# Patient Record
Sex: MALE | Race: BLACK OR AFRICAN AMERICAN | Employment: OTHER | URBAN - METROPOLITAN AREA
[De-identification: names, ages, dates, MRNs, and addresses within clinical notes are randomized per-mention and may not be internally consistent; named-entity substitution may affect disease eponyms.]

---

## 2018-03-13 ENCOUNTER — OFFICE VISIT (OUTPATIENT)
Dept: ORTHOPEDIC SURGERY | Facility: CLINIC | Age: 58
End: 2018-03-13

## 2018-03-13 VITALS
HEART RATE: 87 BPM | BODY MASS INDEX: 24.92 KG/M2 | WEIGHT: 184 LBS | OXYGEN SATURATION: 99 % | SYSTOLIC BLOOD PRESSURE: 133 MMHG | DIASTOLIC BLOOD PRESSURE: 86 MMHG | TEMPERATURE: 98.4 F | RESPIRATION RATE: 16 BRPM | HEIGHT: 72 IN

## 2018-03-13 DIAGNOSIS — G89.29 CHRONIC PAIN OF LEFT KNEE: ICD-10-CM

## 2018-03-13 DIAGNOSIS — M25.562 CHRONIC PAIN OF LEFT KNEE: ICD-10-CM

## 2018-03-13 DIAGNOSIS — M17.12 PRIMARY OSTEOARTHRITIS OF LEFT KNEE: Primary | ICD-10-CM

## 2018-03-13 RX ORDER — BETAMETHASONE SODIUM PHOSPHATE AND BETAMETHASONE ACETATE 3; 3 MG/ML; MG/ML
6 INJECTION, SUSPENSION INTRA-ARTICULAR; INTRALESIONAL; INTRAMUSCULAR; SOFT TISSUE ONCE
Qty: 0.5 ML | Refills: 0
Start: 2018-03-13 | End: 2018-03-13

## 2018-03-13 RX ORDER — BUPIVACAINE HYDROCHLORIDE 2.5 MG/ML
4 INJECTION, SOLUTION EPIDURAL; INFILTRATION; INTRACAUDAL ONCE
Qty: 4 ML | Refills: 0
Start: 2018-03-13 | End: 2018-03-13

## 2018-03-13 RX ORDER — SIMVASTATIN 20 MG/1
TABLET, FILM COATED ORAL
COMMUNITY

## 2018-03-13 NOTE — PROGRESS NOTES
Patient: Jade Lizarraga                MRN: [id number]       SSN: [id number]  YOB: 1960        AGE: 62 y.o. SEX: male    PCP: No primary care provider on file.  03/13/18    Chief Complaint   Patient presents with    Knee Pain     L KNEE PAIN 2/10     HISTORY:  Jade Lizarraga is a 62 y.o. male who is seen for left knee pain. He reports pain for the past 2 months with no history of injury. He notes relief with OTC NSAIDs and pain relievers. He notes pain with standing and walking. He notes increased pain especially when kickboxing- especially when twisting on his left leg during support stance. He states the majority of the kicks involved planting his left leg and kicking with his right. Pain Assessment  3/13/2018   Location of Pain Knee   Location Modifiers Left   Severity of Pain 2   Quality of Pain Aching   Duration of Pain A few minutes   Aggravating Factors Bending;Stretching   Limiting Behavior No   Relieving Factors Ice;Heat;Elevation;NSAID   Relieving Factors Comment compression   Result of Injury No     Occupation, etc:  Mr. Mikhail Vinson retired as an  at the [employer]. He retired about 1 year ago. He is originally from [place] but moved up here after graduating from college 35 years ago. He lives in [place] with his wife. He has two adult sons and 7 grandchildren. He has one son in the [employer] and one in school at [employer]. He enjoys kickboxing for exercise and fitness. He states he has been participating in kickboxing for the past 7 years. Current weight is 184 pounds. He is 6' tall. He is not diabetic or hypertensive. No results found for: HBA1C, HGBE8, CLS8XFBB, WGQ6RTKK, MAN3ZKWP  Weight Metrics 3/13/2018   Weight 184 lb   BMI 24.95 kg/m2       There is no problem list on file for this patient. REVIEW OF SYSTEMS: All Below are Negative except: See HPI   Constitutional: negative for fever, chills, and weight loss.    Cardiovascular: negative for chest pain, claudication, leg swelling, SOB, ROLDAN   Gastrointestinal: Negative for pain, N/V/C/D, Blood in stool or urine, dysuria,  hematuria, incontinence, pelvic pain. Musculoskeletal: See HPI   Neurological: Negative for dizziness and weakness. Negative for headaches, Visual changes, confusion, seizures   Phychiatric/Behavioral: Negative for depression, memory loss, substance  abuse. Extremities: Negative for hair changes, rash, or skin lesion changes. Hematologic: Negative for bleeding problems, bruising, pallor or swollen lymph  nodes   Peripheral Vascular: No calf pain, no circulation deficits. Social History     Social History    Marital status:      Spouse name: N/A    Number of children: N/A    Years of education: N/A     Occupational History    Not on file. Social History Main Topics    Smoking status: Never Smoker    Smokeless tobacco: Never Used    Alcohol use No    Drug use: No    Sexual activity: Not on file     Other Topics Concern    Not on file     Social History Narrative    No narrative on file      Not on File   Current Outpatient Prescriptions   Medication Sig    simvastatin (ZOCOR) 20 mg tablet Take  by mouth nightly. No current facility-administered medications for this visit.        PHYSICAL EXAMINATION:  Visit Vitals    /86    Pulse 87    Temp 98.4 °F (36.9 °C) (Oral)    Resp 16    Ht 6' (1.829 m)    Wt 184 lb (83.5 kg)    SpO2 99%    BMI 24.95 kg/m2      ORTHO EXAMINATION:  Examination Right knee Left knee   Skin Intact Intact   Range of motion 120-0 115-0   Effusion - -   Medial joint line tenderness + +   Lateral joint line tenderness - -   Popliteal tenderness - -   Osteophytes palpable - +, medial   Dannys - -   Patella crepitus - +   Anterior drawer - -   Lateral laxity - -   Medial laxity - -   Varus deformity - -   Valgus deformity - -   Pretibial edema - -   Calf tenderness - -     PROCEDURE:  After discussing treatment options, patient's left knee was injected with 4 cc Marcaine and 1/2 cc Celestone. Chart reviewed for the following:   Lyly Doyle MD, have reviewed the History, Physical and updated the Allergic reactions for [address] performed immediately prior to start of procedure:  Lyly Doyle MD, have performed the following reviews on Laura Morel prior to the start of the procedure:            * Patient was identified by name and date of birth   * Agreement on procedure being performed was verified  * Risks and Benefits explained to the patient  * Procedure site verified and marked as necessary  * Patient was positioned for comfort  * Consent was obtained     Time: 9:31 AM     Date of procedure: 3/13/2018    Procedure performed by:  Amada Dawson MD    Mr. Melissa Stark tolerated the procedure well with no complications. RADIOGRAPHS:  XR LEFT KNEE 3/13/18  IMPRESSION:  Three views - No fractures, no effusion, moderate medial joint space narrowing, + osteophytes present. Sclerosis and small defect of the medial femoral condyles. IMPRESSION:      ICD-10-CM ICD-9-CM    1. Primary osteoarthritis of left knee M17.12 715.16 betamethasone (CELESTONE SOLUSPAN) 6 mg/mL injection      BETAMETHASONE ACETATE & SODIUM PHOSPHATE INJECTION 3 MG EA.      DRAIN/INJECT LARGE JOINT/BURSA      bupivacaine, PF, (MARCAINE, PF,) 0.25 % (2.5 mg/mL) injection      PROCEDURE AUTHORIZATION TO    2. Chronic pain of left knee M25.562 719.46 AMB POC X-RAY KNEE 3 VIEW    G89.29 338.29 betamethasone (CELESTONE SOLUSPAN) 6 mg/mL injection      BETAMETHASONE ACETATE & SODIUM PHOSPHATE INJECTION 3 MG EA.      DRAIN/INJECT LARGE JOINT/BURSA      bupivacaine, PF, (MARCAINE, PF,) 0.25 % (2.5 mg/mL) injection      PROCEDURE AUTHORIZATION TO      PLAN:  After discussing treatment options, patient's left knee was injected with 4 cc Marcaine and 1/2 cc Celestone. He will follow up as needed.  Consider visco supplementation if pain continues. We discussed a possible left knee MRI in the future if pain continues.       Scribed by Kashif Christensen ([address]) as dictated by Parul Serrano MD

## 2018-03-13 NOTE — MR AVS SNAPSHOT
Juan Courtney[facility] 
 
 
 [address], [address] [place] [address] 
[phone] Patient: Laura Morel MRN: [id number] TJL:[birth year] Visit Information Date & Time Provider Department Dept. Phone Encounter #  
 3/13/2018  8:30 AM Amada Dawson, [facility]  Follow-up Instructions Return if symptoms worsen or fail to improve. Upcoming Health Maintenance Date Due Hepatitis C Screening 1960 DTaP/Tdap/Td series (1 - Tdap) 3/13/1981 FOBT Q 1 YEAR AGE 50-75 3/13/2010 Influenza Age 5 to Adult 8/1/2017 Allergies as of 3/13/2018  Review Complete On: 3/13/2018 By: Nathan Molina Not on File Current Immunizations  Never Reviewed No immunizations on file. Not reviewed this visit You Were Diagnosed With   
  
 Codes Comments Primary osteoarthritis of left knee    -  Primary ICD-10-CM: M17.12 
ICD-9-CM: 715.16 Chronic pain of left knee     ICD-10-CM: M25.562, G89.29 ICD-9-CM: 719.46, 338.29 Vitals BP Pulse Temp Resp Height(growth percentile) Weight(growth percentile) 133/86 87 98.4 °F (36.9 °C) (Oral) 16 6' (1.829 m) 184 lb (83.5 kg) SpO2 BMI Smoking Status 99% 24.95 kg/m2 Never Smoker BMI and BSA Data Body Mass Index Body Surface Area 24.95 kg/m 2 2.06 m 2 Your Updated Medication List  
  
   
This list is accurate as of 3/13/18  9:36 AM.  Always use your most recent med list.  
  
  
  
  
 simvastatin 20 mg tablet Commonly known as:  ZOCOR Take  by mouth nightly. We Performed the Following AMB POC X-RAY KNEE 3 VIEW [70614 CPT(R)] Follow-up Instructions Return if symptoms worsen or fail to improve. Patient Instructions Knee Arthritis: Exercises Your Care Instructions Here are some examples of exercises for knee arthritis.  Start each exercise slowly. Ease off the exercise if you start to have pain. Your doctor or physical therapist will tell you when you can start these exercises and which ones will work best for you. How to do the exercises Knee flexion with heel slide 1. Lie on your back with your knees bent. 2. Slide your heel back by bending your affected knee as far as you can. Then hook your other foot around your ankle to help pull your heel even farther back. 3. Hold for about 6 seconds, then rest for up to 10 seconds. 4. Repeat 8 to 12 times. 5. Switch legs and repeat steps 1 through 4, even if only one knee is sore. [facility] 1. Sit with your affected leg straight and supported on the floor or a firm bed. Place a small, rolled-up towel under your knee. Your other leg should be bent, with that foot flat on the floor. 2. Tighten the thigh muscles of your affected leg by pressing the back of your knee down into the towel. 3. Hold for about 6 seconds, then rest for up to 10 seconds. 4. Repeat 8 to 12 times. 5. Switch legs and repeat steps 1 through 4, even if only one knee is sore. Straight-leg raises to the front 1. Lie on your back with your good knee bent so that your foot rests flat on the floor. Your affected leg should be straight. Make sure that your low back has a normal curve. You should be able to slip your hand in between the floor and the small of your back, with your palm touching the floor and your back touching the back of your hand. 2. Tighten the thigh muscles in your affected leg by pressing the back of your knee flat down to the floor. Hold your knee straight. 3. Keeping the thigh muscles tight and your leg straight, lift your affected leg up so that your heel is about 12 inches off the floor. Hold for about 6 seconds, then lower slowly. 4. Relax for up to 10 seconds between repetitions. 5. Repeat 8 to 12 times. 6. Switch legs and repeat steps 1 through 5, even if only one knee is sore. Active knee flexion 1. Lie on your stomach with your knees straight. If your kneecap is uncomfortable, roll up a washcloth and put it under your leg just above your kneecap. 2. Lift the foot of your affected leg by bending the knee so that you bring the foot up toward your buttock. If this motion hurts, try it without bending your knee quite as far. This may help you avoid any painful motion. 3. Slowly move your leg up and down. 4. Repeat 8 to 12 times. 5. Switch legs and repeat steps 1 through 4, even if only one knee is sore. Quadriceps stretch (facedown) 1. Lie flat on your stomach, and rest your face on the floor. 2. Wrap a towel or belt strap around the lower part of your affected leg. Then use the towel or belt strap to slowly pull your heel toward your buttock until you feel a stretch. 3. Hold for about 15 to 30 seconds, then relax your leg against the towel or belt strap. 4. Repeat 2 to 4 times. 5. Switch legs and repeat steps 1 through 4, even if only one knee is sore. Stationary exercise bike 1. If you do not have a stationary exercise bike at home, you can find one to ride at your local health club or community center. 2. Adjust the height of the bike seat so that your knee is slightly bent when your leg is extended downward. If your knee hurts when the pedal reaches the top, you can raise the seat so that your knee does not bend as much. 3. Start slowly. At first, try to do 5 to 10 minutes of cycling with little to no resistance. Then increase your time and the resistance bit by bit until you can do 20 to 30 minutes without pain. 4. If you start to have pain, rest your knee until your pain gets back to the level that is normal for you. Or cycle for less time or with less effort. Follow-up care is a key part of your treatment and safety.  Be sure to make and go to all appointments, and call your doctor if you are having problems. It's also a good idea to know your test results and keep a list of the medicines you take. Where can you learn more? Go to http://cash-chad.info/. Enter C159 in the search box to learn more about \"Knee Arthritis: Exercises. \" Current as of: March 21, 2017 Content Version: 11.4 © 4588-0788 [employer]. Care instructions adapted under license by [employer] (which disclaims liability or warranty for this information). If you have questions about a medical condition or this instruction, always ask your healthcare professional. [place] [address] any warranty or liability for your use of this information. Joint Injections: Care Instructions Your Care Instructions Joint injections are shots into a joint, such as the knee. They may be used to put in medicines, such as pain relievers. Or they can be used to take out fluid. Sometimes the fluid is tested in a lab. This can help find the cause of a joint problem. A corticosteroid, or steroid, shot is used to reduce inflammation in tendons or joints. It is often used to treat problems such as arthritis, tendinitis, and bursitis. Steroids can be injected directly into a painful, inflamed joint. They can also help reduce inflammation of a bursa. A bursa is a sac of fluid. It cushions and lubricates areas where tendons, ligaments, skin, muscles, or bones rub against each other. A steroid shot can sometimes help with short-term pain relief when other treatments haven't worked. If steroid shots help, pain may improve for weeks or months. Follow-up care is a key part of your treatment and safety. Be sure to make and go to all appointments, and call your doctor if you are having problems. It's also a good idea to know your test results and keep a list of the medicines you take. How can you care for yourself at home? · Put ice or a cold pack on the area for 10 to 20 minutes at a time.  Put a thin cloth between the ice and your skin. · Take anti-inflammatory medicines to reduce pain, swelling, or inflammation. These include ibuprofen (Advil, Motrin) and naproxen (Aleve). Read and follow all instructions on the label. · Avoid strenuous activities for several days, especially those that put stress on the area where you got the shot. · If you have dressings over the area, keep them clean and dry. You may remove them when your doctor tells you to. When should you call for help? Call your doctor now or seek immediate medical care if: 
? · You have signs of infection, such as: 
¨ Increased pain, swelling, warmth, or redness. ¨ Red streaks leading from the site. ¨ Pus draining from the site. ¨ A fever. ? Watch closely for changes in your health, and be sure to contact your doctor if you have any problems. Where can you learn more? Go to http://cash-chad.info/. Enter N616 in the search box to learn more about \"Joint Injections: Care Instructions. \" Current as of: March 21, 2017 Content Version: 11.4 © 5970-0773 Xenith. Care instructions adapted under license by [employer] Searcher (which disclaims liability or warranty for this information). If you have questions about a medical condition or this instruction, always ask your healthcare professional. [address] any warranty or liability for your use of this information. Introducing [facility]! Toshia Hawley introduces [address] patient portal. Now you can access parts of your medical record, email your doctor's office, and request medication refills online. 1. In your internet browser, go to [email or web address] 2. Click on the First Time User? Click Here link in the Sign In box. You will see the New Member Sign Up page. 3. Enter your [address] Access Code exactly as it appears below.  You will not need to use this code after youve completed the sign-up process. If you do not sign up before the expiration date, you must request a new code. · Pocket Video Access Code: [email or web address] Expires: 6/11/2018  8:37 AM 
 
4. Enter the last four digits of your Social Security Number (xxxx) and Date of Birth (mm/dd/yyyy) as indicated and click Submit. You will be taken to the next sign-up page. 5. Create a Pocket Video ID. This will be your Pocket Video login ID and cannot be changed, so think of one that is secure and easy to remember. 6. Create a Pocket Video password. You can change your password at any time. 7. Enter your Password Reset Question and Answer. This can be used at a later time if you forget your password. 8. Enter your e-mail address. You will receive e-mail notification when new information is available in [address]. 9. Click Sign Up. You can now view and download portions of your medical record. 10. Click the Download Summary menu link to download a portable copy of your medical information. If you have questions, please visit the Frequently Asked Questions section of the Pocket Video website. Remember, Pocket Video is NOT to be used for urgent needs. For medical emergencies, dial 911. Now available from your iPhone and Android! Please provide this summary of care documentation to your next provider. If you have any questions after today's visit, please call [phone].

## 2018-03-13 NOTE — PATIENT INSTRUCTIONS
Knee Arthritis: Exercises  Your Care Instructions  Here are some examples of exercises for knee arthritis. Start each exercise slowly. Ease off the exercise if you start to have pain. Your doctor or physical therapist will tell you when you can start these exercises and which ones will work best for you. How to do the exercises  Knee flexion with heel slide    1. Lie on your back with your knees bent. 2. Slide your heel back by bending your affected knee as far as you can. Then hook your other foot around your ankle to help pull your heel even farther back. 3. Hold for about 6 seconds, then rest for up to 10 seconds. 4. Repeat 8 to 12 times. 5. Switch legs and repeat steps 1 through 4, even if only one knee is sore. Quad sets    1. Sit with your affected leg straight and supported on the floor or a firm bed. Place a small, rolled-up towel under your knee. Your other leg should be bent, with that foot flat on the floor. 2. Tighten the thigh muscles of your affected leg by pressing the back of your knee down into the towel. 3. Hold for about 6 seconds, then rest for up to 10 seconds. 4. Repeat 8 to 12 times. 5. Switch legs and repeat steps 1 through 4, even if only one knee is sore. Straight-leg raises to the front    1. Lie on your back with your good knee bent so that your foot rests flat on the floor. Your affected leg should be straight. Make sure that your low back has a normal curve. You should be able to slip your hand in between the floor and the small of your back, with your palm touching the floor and your back touching the back of your hand. 2. Tighten the thigh muscles in your affected leg by pressing the back of your knee flat down to the floor. Hold your knee straight. 3. Keeping the thigh muscles tight and your leg straight, lift your affected leg up so that your heel is about 12 inches off the floor. Hold for about 6 seconds, then lower slowly.   4. Relax for up to 10 seconds between repetitions. 5. Repeat 8 to 12 times. 6. Switch legs and repeat steps 1 through 5, even if only one knee is sore. Active knee flexion    1. Lie on your stomach with your knees straight. If your kneecap is uncomfortable, roll up a washcloth and put it under your leg just above your kneecap. 2. Lift the foot of your affected leg by bending the knee so that you bring the foot up toward your buttock. If this motion hurts, try it without bending your knee quite as far. This may help you avoid any painful motion. 3. Slowly move your leg up and down. 4. Repeat 8 to 12 times. 5. Switch legs and repeat steps 1 through 4, even if only one knee is sore. Quadriceps stretch (facedown)    1. Lie flat on your stomach, and rest your face on the floor. 2. Wrap a towel or belt strap around the lower part of your affected leg. Then use the towel or belt strap to slowly pull your heel toward your buttock until you feel a stretch. 3. Hold for about 15 to 30 seconds, then relax your leg against the towel or belt strap. 4. Repeat 2 to 4 times. 5. Switch legs and repeat steps 1 through 4, even if only one knee is sore. Stationary exercise bike    1. If you do not have a stationary exercise bike at home, you can find one to ride at your local health club or community center. 2. Adjust the height of the bike seat so that your knee is slightly bent when your leg is extended downward. If your knee hurts when the pedal reaches the top, you can raise the seat so that your knee does not bend as much. 3. Start slowly. At first, try to do 5 to 10 minutes of cycling with little to no resistance. Then increase your time and the resistance bit by bit until you can do 20 to 30 minutes without pain. 4. If you start to have pain, rest your knee until your pain gets back to the level that is normal for you. Or cycle for less time or with less effort. Follow-up care is a key part of your treatment and safety.  Be sure to make and go to all appointments, and call your doctor if you are having problems. It's also a good idea to know your test results and keep a list of the medicines you take. Where can you learn more? Go to http://cash-chad.info/. Enter C159 in the search box to learn more about \"Knee Arthritis: Exercises. \"  Current as of: March 21, 2017  Content Version: 11.4  © 2006-2017 [employer]. Care instructions adapted under license by [employer] My Game (which disclaims liability or warranty for this information). If you have questions about a medical condition or this instruction, always ask your healthcare professional. [place] [address] any warranty or liability for your use of this information. Joint Injections: Care Instructions  Your Care Instructions  Joint injections are shots into a joint, such as the knee. They may be used to put in medicines, such as pain relievers. Or they can be used to take out fluid. Sometimes the fluid is tested in a lab. This can help find the cause of a joint problem. A corticosteroid, or steroid, shot is used to reduce inflammation in tendons or joints. It is often used to treat problems such as arthritis, tendinitis, and bursitis. Steroids can be injected directly into a painful, inflamed joint. They can also help reduce inflammation of a bursa. A bursa is a sac of fluid. It cushions and lubricates areas where tendons, ligaments, skin, muscles, or bones rub against each other. A steroid shot can sometimes help with short-term pain relief when other treatments haven't worked. If steroid shots help, pain may improve for weeks or months. Follow-up care is a key part of your treatment and safety. Be sure to make and go to all appointments, and call your doctor if you are having problems. It's also a good idea to know your test results and keep a list of the medicines you take. How can you care for yourself at home?   · Put ice or a cold pack on the area for 10 to 20 minutes at a time. Put a thin cloth between the ice and your skin. · Take anti-inflammatory medicines to reduce pain, swelling, or inflammation. These include ibuprofen (Advil, Motrin) and naproxen (Aleve). Read and follow all instructions on the label. · Avoid strenuous activities for several days, especially those that put stress on the area where you got the shot. · If you have dressings over the area, keep them clean and dry. You may remove them when your doctor tells you to. When should you call for help? Call your doctor now or seek immediate medical care if:  ? · You have signs of infection, such as:  ¨ Increased pain, swelling, warmth, or redness. ¨ Red streaks leading from the site. ¨ Pus draining from the site. ¨ A fever. ? Watch closely for changes in your health, and be sure to contact your doctor if you have any problems. Where can you learn more? Go to http://cash-chad.info/. Enter N616 in the search box to learn more about \"Joint Injections: Care Instructions. \"  Current as of: March 21, 2017  Content Version: 11.4  © 2257-2947 [employer]Buzz. Care instructions adapted under license by [employer] (which disclaims liability or warranty for this information). If you have questions about a medical condition or this instruction, always ask your healthcare professional. [place] [address] any warranty or liability for your use of this information.

## 2018-04-26 ENCOUNTER — OFFICE VISIT (OUTPATIENT)
Dept: ORTHOPEDIC SURGERY | Facility: CLINIC | Age: 58
End: 2018-04-26

## 2018-04-26 VITALS
OXYGEN SATURATION: 98 % | HEIGHT: 72 IN | TEMPERATURE: 98.2 F | RESPIRATION RATE: 18 BRPM | SYSTOLIC BLOOD PRESSURE: 132 MMHG | DIASTOLIC BLOOD PRESSURE: 75 MMHG | WEIGHT: 185.2 LBS | BODY MASS INDEX: 25.09 KG/M2 | HEART RATE: 90 BPM

## 2018-04-26 DIAGNOSIS — G89.29 CHRONIC PAIN OF LEFT KNEE: ICD-10-CM

## 2018-04-26 DIAGNOSIS — M25.562 CHRONIC PAIN OF LEFT KNEE: ICD-10-CM

## 2018-04-26 DIAGNOSIS — M17.12 PRIMARY OSTEOARTHRITIS OF LEFT KNEE: Primary | ICD-10-CM

## 2018-04-26 RX ORDER — BETAMETHASONE SODIUM PHOSPHATE AND BETAMETHASONE ACETATE 3; 3 MG/ML; MG/ML
6 INJECTION, SUSPENSION INTRA-ARTICULAR; INTRALESIONAL; INTRAMUSCULAR; SOFT TISSUE ONCE
Qty: 0.5 ML | Refills: 0
Start: 2018-04-26 | End: 2018-04-26

## 2018-04-26 RX ORDER — BUPIVACAINE HYDROCHLORIDE 2.5 MG/ML
4 INJECTION, SOLUTION EPIDURAL; INFILTRATION; INTRACAUDAL ONCE
Qty: 4 ML | Refills: 0
Start: 2018-04-26 | End: 2018-04-26

## 2018-04-26 NOTE — PROGRESS NOTES
Patient: Kristy Inman                MRN: [id number]       SSN: [id number]  YOB: 1960        AGE: 62 y.o. SEX: male    PCP: No primary care provider on file. 04/26/18    Chief Complaint   Patient presents with    Knee Pain     Left     HISTORY:  Kristy Inman is a 62 y.o. male who is seen for increased left knee pain. His last knee injection helped for about a week with incomplete relief. He reports pain for the past 2 months with no history of injury. He notes relief with OTC NSAIDs and pain relievers. He notes pain with standing and walking. He notes increased pain especially when kickboxing- especially when twisting on his left leg during support stance. He states the majority of the kicks involved planting his left leg and kicking with his right. He has tried a variety of conservative measures including cortisone injections, NSAIDs, and physical therapy. Pain Assessment  3/13/2018   Location of Pain Knee   Location Modifiers Left   Severity of Pain 2   Quality of Pain Aching   Duration of Pain A few minutes   Aggravating Factors Bending;Stretching   Limiting Behavior No   Relieving Factors Ice;Heat;Elevation;NSAID   Relieving Factors Comment compression   Result of Injury No     Occupation, etc:  Mr. Lise Golden retired as an  at the [employer]. He retired about 1 year ago. He is originally from [place] but moved up here after graduating from college 35 years ago. He lives in [place] with his wife. He has two adult sons and 7 grandchildren. He has one son in the [place] and one in school at [employer]. He enjoys kickboxing for exercise and fitness. He states he has been participating in kickboxing for the past 7 years. Current weight is 185 pounds. He is 6' tall. He is not diabetic or hypertensive.      No results found for: HBA1C, HGBE8, LLK2VIUA, WDZ1VHCZ, NAS9CYYE  Weight Metrics 4/26/2018 3/13/2018   Weight 185 lb 3.2 oz 184 lb   BMI 25.12 kg/m2 24.95 kg/m2       There is no problem list on file for this patient. REVIEW OF SYSTEMS: All Below are Negative except: See HPI   Constitutional: negative for fever, chills, and weight loss. Cardiovascular: negative for chest pain, claudication, leg swelling, SOB, ROLDAN   Gastrointestinal: Negative for pain, N/V/C/D, Blood in stool or urine, dysuria,  hematuria, incontinence, pelvic pain. Musculoskeletal: See HPI   Neurological: Negative for dizziness and weakness. Negative for headaches, Visual changes, confusion, seizures   Phychiatric/Behavioral: Negative for depression, memory loss, substance  abuse. Extremities: Negative for hair changes, rash, or skin lesion changes. Hematologic: Negative for bleeding problems, bruising, pallor or swollen lymph  nodes   Peripheral Vascular: No calf pain, no circulation deficits. Social History     Social History    Marital status:      Spouse name: N/A    Number of children: N/A    Years of education: N/A     Occupational History    Not on file. Social History Main Topics    Smoking status: Never Smoker    Smokeless tobacco: Never Used    Alcohol use No    Drug use: No    Sexual activity: Not on file     Other Topics Concern    Not on file     Social History Narrative      No Known Allergies   Current Outpatient Prescriptions   Medication Sig    simvastatin (ZOCOR) 20 mg tablet Take  by mouth nightly. No current facility-administered medications for this visit.        PHYSICAL EXAMINATION:  Visit Vitals    /75    Pulse 90    Temp 98.2 °F (36.8 °C) (Oral)    Resp 18    Ht 6' (1.829 m)    Wt 185 lb 3.2 oz (84 kg)    SpO2 98%    BMI 25.12 kg/m2      ORTHO EXAMINATION:  Examination Right knee Left knee   Skin Intact Intact   Range of motion 120-0 115-0   Effusion - -   Medial joint line tenderness + +   Lateral joint line tenderness - -   Popliteal tenderness - -   Osteophytes palpable - +, medial   Dannys - -   Patella crepitus - +   Anterior drawer - -   Lateral laxity - -   Medial laxity - -   Varus deformity - -   Valgus deformity - -   Pretibial edema - -   Calf tenderness - -   Wearing a soft knee sleeve on his left knee    PROCEDURE:  After discussing treatment options, patient's left knee was injected with 4 cc Marcaine and 1/2 cc Celestone. Chart reviewed for the following:   Imani Sanchez MD, have reviewed the History, Physical and updated the Allergic reactions for [address] performed immediately prior to start of procedure:  Imani Sanchez MD, have performed the following reviews on Lisa Alanan prior to the start of the procedure:            * Patient was identified by name and date of birth   * Agreement on procedure being performed was verified  * Risks and Benefits explained to the patient  * Procedure site verified and marked as necessary  * Patient was positioned for comfort  * Consent was obtained     Time: 2:33 PM     Date of procedure: 4/26/2018    Procedure performed by:  Carolina Salmeron MD    Mr. Umm Mclean tolerated the procedure well with no complications. RADIOGRAPHS:  XR LEFT KNEE 3/13/18  IMPRESSION:  Three views - No fractures, no effusion, moderate medial joint space narrowing, + osteophytes present. Sclerosis and small defect of the medial femoral condyles. IMPRESSION:      ICD-10-CM ICD-9-CM    1. Primary osteoarthritis of left knee M17.12 715.16 PROCEDURE AUTHORIZATION TO       betamethasone (CELESTONE SOLUSPAN) 6 mg/mL injection      BETAMETHASONE ACETATE & SODIUM PHOSPHATE INJECTION 3 MG EA.      DRAIN/INJECT LARGE JOINT/BURSA      bupivacaine, PF, (MARCAINE, PF,) 0.25 % (2.5 mg/mL) injection      REFERRAL TO PHYSICAL THERAPY   2.  Chronic pain of left knee M25.562 719.46 PROCEDURE AUTHORIZATION TO     G89.29 338.29 betamethasone (CELESTONE SOLUSPAN) 6 mg/mL injection      BETAMETHASONE ACETATE & SODIUM PHOSPHATE INJECTION 3 MG EA. DRAIN/INJECT LARGE JOINT/BURSA      bupivacaine, PF, (MARCAINE, PF,) 0.25 % (2.5 mg/mL) injection      REFERRAL TO PHYSICAL THERAPY     PLAN:  After discussing treatment options, patient's left knee was injected with 4 cc Marcaine and 1/2 cc Celestone. He will follow up as needed. Consider visco supplementation if pain continues. He has tried a variety of conservative measures including NSAIDs, injections, and activity restrictions all with incomplete temporary relief. He will start a brief course of outpatient physical therapy to his left knee. We discussed a possible left knee MRI in the future if pain continues.      Scribed by Rory Hewitt ([address] [place] [address] [address]) as dictated by Mesfin Jones MD

## 2018-04-26 NOTE — PATIENT INSTRUCTIONS
Knee Arthritis: Exercises  Your Care Instructions  Here are some examples of exercises for knee arthritis. Start each exercise slowly. Ease off the exercise if you start to have pain. Your doctor or physical therapist will tell you when you can start these exercises and which ones will work best for you. How to do the exercises  Knee flexion with heel slide    1. Lie on your back with your knees bent. 2. Slide your heel back by bending your affected knee as far as you can. Then hook your other foot around your ankle to help pull your heel even farther back. 3. Hold for about 6 seconds, then rest for up to 10 seconds. 4. Repeat 8 to 12 times. 5. Switch legs and repeat steps 1 through 4, even if only one knee is sore. Quad sets    1. Sit with your affected leg straight and supported on the floor or a firm bed. Place a small, rolled-up towel under your knee. Your other leg should be bent, with that foot flat on the floor. 2. Tighten the thigh muscles of your affected leg by pressing the back of your knee down into the towel. 3. Hold for about 6 seconds, then rest for up to 10 seconds. 4. Repeat 8 to 12 times. 5. Switch legs and repeat steps 1 through 4, even if only one knee is sore. Straight-leg raises to the front    1. Lie on your back with your good knee bent so that your foot rests flat on the floor. Your affected leg should be straight. Make sure that your low back has a normal curve. You should be able to slip your hand in between the floor and the small of your back, with your palm touching the floor and your back touching the back of your hand. 2. Tighten the thigh muscles in your affected leg by pressing the back of your knee flat down to the floor. Hold your knee straight. 3. Keeping the thigh muscles tight and your leg straight, lift your affected leg up so that your heel is about 12 inches off the floor. Hold for about 6 seconds, then lower slowly.   4. Relax for up to 10 seconds between repetitions. 5. Repeat 8 to 12 times. 6. Switch legs and repeat steps 1 through 5, even if only one knee is sore. Active knee flexion    1. Lie on your stomach with your knees straight. If your kneecap is uncomfortable, roll up a washcloth and put it under your leg just above your kneecap. 2. Lift the foot of your affected leg by bending the knee so that you bring the foot up toward your buttock. If this motion hurts, try it without bending your knee quite as far. This may help you avoid any painful motion. 3. Slowly move your leg up and down. 4. Repeat 8 to 12 times. 5. Switch legs and repeat steps 1 through 4, even if only one knee is sore. Quadriceps stretch (facedown)    1. Lie flat on your stomach, and rest your face on the floor. 2. Wrap a towel or belt strap around the lower part of your affected leg. Then use the towel or belt strap to slowly pull your heel toward your buttock until you feel a stretch. 3. Hold for about 15 to 30 seconds, then relax your leg against the towel or belt strap. 4. Repeat 2 to 4 times. 5. Switch legs and repeat steps 1 through 4, even if only one knee is sore. Stationary exercise bike    1. If you do not have a stationary exercise bike at home, you can find one to ride at your local health club or community center. 2. Adjust the height of the bike seat so that your knee is slightly bent when your leg is extended downward. If your knee hurts when the pedal reaches the top, you can raise the seat so that your knee does not bend as much. 3. Start slowly. At first, try to do 5 to 10 minutes of cycling with little to no resistance. Then increase your time and the resistance bit by bit until you can do 20 to 30 minutes without pain. 4. If you start to have pain, rest your knee until your pain gets back to the level that is normal for you. Or cycle for less time or with less effort. Follow-up care is a key part of your treatment and safety.  Be sure to make and go to all appointments, and call your doctor if you are having problems. It's also a good idea to know your test results and keep a list of the medicines you take. Where can you learn more? Go to http://cash-chad.info/. Enter C159 in the search box to learn more about \"Knee Arthritis: Exercises. \"  Current as of: March 21, 2017  Content Version: 11.4  © 2006-2017 [employer]. Care instructions adapted under license by [employer] (which disclaims liability or warranty for this information). If you have questions about a medical condition or this instruction, always ask your healthcare professional. [place] [address] any warranty or liability for your use of this information. Joint Injections: Care Instructions  Your Care Instructions  Joint injections are shots into a joint, such as the knee. They may be used to put in medicines, such as pain relievers. Or they can be used to take out fluid. Sometimes the fluid is tested in a lab. This can help find the cause of a joint problem. A corticosteroid, or steroid, shot is used to reduce inflammation in tendons or joints. It is often used to treat problems such as arthritis, tendinitis, and bursitis. Steroids can be injected directly into a painful, inflamed joint. They can also help reduce inflammation of a bursa. A bursa is a sac of fluid. It cushions and lubricates areas where tendons, ligaments, skin, muscles, or bones rub against each other. A steroid shot can sometimes help with short-term pain relief when other treatments haven't worked. If steroid shots help, pain may improve for weeks or months. Follow-up care is a key part of your treatment and safety. Be sure to make and go to all appointments, and call your doctor if you are having problems. It's also a good idea to know your test results and keep a list of the medicines you take. How can you care for yourself at home?   · Put ice or a cold pack on the area for 10 to 20 minutes at a time. Put a thin cloth between the ice and your skin. · Take anti-inflammatory medicines to reduce pain, swelling, or inflammation. These include ibuprofen (Advil, Motrin) and naproxen (Aleve). Read and follow all instructions on the label. · Avoid strenuous activities for several days, especially those that put stress on the area where you got the shot. · If you have dressings over the area, keep them clean and dry. You may remove them when your doctor tells you to. When should you call for help? Call your doctor now or seek immediate medical care if:  ? · You have signs of infection, such as:  ¨ Increased pain, swelling, warmth, or redness. ¨ Red streaks leading from the site. ¨ Pus draining from the site. ¨ A fever. ? Watch closely for changes in your health, and be sure to contact your doctor if you have any problems. Where can you learn more? Go to http://cash-chad.info/. Enter N616 in the search box to learn more about \"Joint Injections: Care Instructions. \"  Current as of: March 21, 2017  Content Version: 11.4  © 4441-5050 [employer]. Care instructions adapted under license by [employer] (which disclaims liability or warranty for this information). If you have questions about a medical condition or this instruction, always ask your healthcare professional. [place] [address] any warranty or liability for your use of this information.

## 2018-05-09 ENCOUNTER — HOSPITAL ENCOUNTER (OUTPATIENT)
Dept: PHYSICAL THERAPY | Age: 58
Discharge: HOME | End: 2018-05-09
Payer: COMMERCIAL

## 2018-05-09 PROCEDURE — 97110 THERAPEUTIC EXERCISES: CPT

## 2018-05-09 PROCEDURE — 97162 PT EVAL MOD COMPLEX 30 MIN: CPT

## 2018-05-09 NOTE — PROGRESS NOTES
PT DAILY TREATMENT NOTE    Patient Name: Vicky Patten  Date:2018  : 1960  [x]  Patient  Verified  Payor: BLUE CROSS / Plan: [address] [place] [address] [place] / Product Type: PPO /    In time:8:10  Out time:8:50  Total Treatment Time (min): 40  Total Timed Codes (min): 40  1:1 Treatment Time (MC only):    Visit #: 1 of 8    Treatment Area: Left knee    SUBJECTIVE  Pain Level (0-10 scale): 2/10  Medication Changes: [] No    [] Yes (see paper medication log)  Subjective functional status/changes:   [] No changes reported  Patient is a 62year old male with chronic left knee pain. 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 Patient states he has had 2 injections in the left knee with first injection given in March and only lasting 1 day; 2nd injection was given 6 weeks later and lasted a few weeks. Patient reports he is not sure what exactly was injected into him and he will follow up with physician to find out.     Pain is located to medial and anterior left knee; described as ache   Current: 1-3/10; Worse: 5/10 - aggravated witeh bending his knee with stairs and getting into and out of vehicle; Best: 0/10 - eased with standing and sitting with knee extended. Quick relief within a few moments  No numbness or tingling, no previous injuries reported ankle, knees or hips either side.     Patient reports doing kickboxing 2-3 times a week and also likes to run and lift weights that has been unable to do as often or as intensive as previous.     OBJECTIVE    Physical Therapy Evaluation - Knee      ROM:  Active/Passive knee Right 5-0-130/5-0-130; Left 5-0-125/5-0-130  MMT: 5/5 BLE with exception to left hip flexion and IR 4+/5  Balance Good EO; Fair plus EC  Stability: negative ACL, LCL, PCL; Grade 1 left MCL with knee flexed 30 degrees  Good: quad control  Elevated bilateral patella with borderline patella fany  Positive patella grind to left with noted palpable spurs medially and likely to patella.     Patient presents with signs and symptoms of left knee degeneration and bone spurs and likely grade 1 MCL sprain. Patient to benefit in physical therapy in order to address above deficits.               OBJECTIVE TREATMENT  Therapeutic Exercise: [x] see flow sheet     [] Other:_ (minutes) :10  Added/Changed Exercises:  []  Added:_  to improve (function):  []  Changed:_ to improve (function):      Patient Education: [x] Review HEP    [] Progressed/Changed HEP based on:_   [] positioning   [] body mechanics   [] transfers   [] heat/ice application   (minutes) :    Pain Level (0-10 scale) post treatment: 1/10    ASSESSMENT  [x]  See Plan of Care    PLAN  See Plan of Care        Lucita Earl, PT 5/9/2018  9:51 AM

## 2018-05-09 NOTE — PROGRESS NOTES
[facility] AT [address], [address] [place] [address] Road  Phone: [phone]   [phone]  PLAN OF CARE / [address] [place] [address] PHYSICAL THERAPY SERVICES  Patient Name: Kelli White : 1960   Medical   Diagnosis: Left knee pain [M25.562] Treatment Diagnosis: Left knee pain [M25.562]   Onset Date: 2017     Referral Source: Porfirio Thompson MD [facility]): 2018   Prior Hospitalization: See medical history Provider #: [id number]   Prior Level of Function: Full independent in all activities   Comorbidities: None   Medications: Verified on Patient Summary List   The Plan of Care and following information is based on the information from the initial evaluation.   ===========================================================================================  Assessment / key information:  Patient is a 62year old male with chronic left knee pain. 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 Patient states he has had 2 injections in the left knee with first injection given in March and only lasting 1 day; 2nd injection was given 6 weeks later and lasted a few weeks. Patient reports he is not sure what exactly was injected into him and he will follow up with physician to find out. Pain is located to medial and anterior left knee; described as ache   Current: 1-3/10; Worse: 5/10 - aggravated witeh bending his knee with stairs and getting into and out of vehicle; Best: 0/10 - eased with standing and sitting with knee extended. Quick relief within a few moments  No numbness or tingling, no previous injuries reported ankle, knees or hips either side. Patient reports doing kickboxing 2-3 times a week and also likes to run and lift weights that has been unable to do as often or as intensive as previous.     ROM: Active/Passive knee Right 5-0-130/5-0-130; Left 5-0-125/5-0-130  MMT: 5/5 BLE with exception to left hip flexion and IR 4+/5  Balance Good EO; Fair plus EC  Stability: negative ACL, LCL, PCL; Grade 1 left MCL with knee flexed 30 degrees  Good: quad control  Elevated bilateral patella with borderline patella fany  Positive patella grind to left with noted palpable spurs medially and likely to patella. Patient presents with signs and symptoms of left knee degeneration and bone spurs and likely grade 1 MCL sprain. Patient to benefit in physical therapy in order to address above deficits.  ===========================================================================================  History MEDIUM  Complexity : 1-2 comorbidities / personal factors will impact the outcome/ POC ; Examination MEDIUM Complexity : 3 Standardized tests and measures addressing body structure, function, activity limitation and / or participation in recreation ; Presentation MEDIUM Complexity : Evolving with changing characteristics ; Decision Making MEDIUM Complexity : FOTO score of 26-74; Complexity MEDIUM  Problem List: pain affecting function, decrease ROM, decrease strength and decrease ADL/ functional abilitiies   Treatment Plan may include any combination of the following: Therapeutic exercise, Physical agent/modality, Manual therapy, Patient education and Self Care training  Patient / Family readiness to learn indicated by: asking questions, trying to perform skills and interest  Persons(s) to be included in education: patient (P)  Barriers to Learning/Limitations: None  Measures taken:    Patient Goal (s): Reduce pain and improve motion   Patient self reported health status: good  Rehabilitation Potential: good  · Short Term Goals: To be accomplished in  4  treatments:   Independent/compliant with long term HEP for strength, flexibility  Improve left hip strength to 5/5 to improve muscle control for ADL's  Improve left knee active motion to 100% symmetrical to improve functional motion for ADLs  · Long Term Goals: To be accomplished in  8  treatments:  Pt will report at least 75% functional improvement with kickboxing  Pt will report at least 50% functional improvement with stair climbing  Improve FOTO outcome score by 18% to indicate a significant improvement in ADL function    Frequency / Duration:   Patient to be seen  2  times per week for 2  weeks and then 1 time per week for 4 weeks:  Patient / Caregiver education and instruction: self care, activity modification and exercises    Therapist Signature: Shashank Orellana PT Date: 2/6/4360   Certification Period: 5/9/2018 to 8/8/2018 Time: 8:17 AM   ===========================================================================================  I certify that the above Physical Therapy Services are being furnished while the patient is under my care. I agree with the treatment plan and certify that this therapy is necessary. Physician Signature:        Date:       Time:     Please sign and return to [facility] or you may fax the signed copy to [phone]. Thank you.

## 2018-05-17 ENCOUNTER — HOSPITAL ENCOUNTER (OUTPATIENT)
Dept: PHYSICAL THERAPY | Age: 58
Discharge: HOME | End: 2018-05-17
Payer: COMMERCIAL

## 2018-05-17 PROCEDURE — 97110 THERAPEUTIC EXERCISES: CPT

## 2018-05-17 PROCEDURE — 97140 MANUAL THERAPY 1/> REGIONS: CPT

## 2018-05-17 NOTE — PROGRESS NOTES
PT DAILY TREATMENT NOTE 8-    Patient Name: Lisa Mondragon  Date:2018  : 1960  [x]  Patient  Verified  Payor: BLUE CROSS / Plan: [facility] / Product Type: PPO /    In time:9:04  Out time:10:15  Total Treatment Time (min): 71  Total Timed Codes (min): 55  1:1 Treatment Time (min):    Visit #: 2 of 8    Treatment Area: Left knee pain [M25.562]    SUBJECTIVE  Pain Level (0-10 scale): 2/10  Any medication changes, allergies to medications, adverse drug reactions, diagnosis change, or new procedure performed?: [x] No    [] Yes (see summary sheet for update)  Subjective functional status/changes:   [] No changes reported  My knee usually hurts the most when I bend it back as far as it will go as well as when I go up and down the stairs and set in and out of my car.     OBJECTIVE  Modality rationale: decrease inflammation and decrease pain to improve the patients ability to improve functional abilities   Min Type Additional Details    [] Estim: []Att   []Unatt        []TENS instruct                  []IFC  []Premod   []NMES                     []Other:  []w/US   []w/ice   []w/heat  Position:  Location:    []  Traction: [] Cervical       []Lumbar                       [] Prone          []Supine                       []Intermittent   []Continuous Lbs:  [] before manual  [] after manual    []  Ultrasound: []Continuous   [] Pulsed                           []1MHz   []3MHz Location:  W/cm2:    []  Iontophoresis with dexamethasone         Location: [] Take home patch   [] In clinic   10 [x]  Ice     []  heat  []  Ice massage Position:supine  Location:left knee    []  Vasopneumatic Device Pressure:       [] lo [] med [] hi   Temperature: [] lo [] med [] hi   [] Skin assessment post-treatment:  []intact []redness- no adverse reaction       []redness - adverse reaction:       53 min Therapeutic Exercise:  [x] See flow sheet :   Rationale: increase ROM, increase strength, improve balance and increase proprioception to improve the patients ability to improve functional abilities    8 min Manual Therapy:  Manual hip adductor and sidelying hip flexor/quadriceps combo stretching    Rationale: increase ROM and increase tissue extensibility to improve functional mobility            min Patient Education: [x] Review HEP    [] Progressed/Changed HEP based on:   [] positioning   [] body mechanics   [] transfers   [] heat/ice application        Other Objective/Functional Measures:     Pain Level (0-10 scale) post treatment: 1/10    ASSESSMENT/Changes in Function: Pt tolerated all new therex well upon trial today with chief c/o increased knee pain with end range active flexion mobility and ascending/descending stairs. Pt needs the most focus on eccentric quad strengthening, knee stabilization and knee flexion mobility. Will continue to progress/advance patient within current POC as tolerated with monitoring symptoms. Patient will continue to benefit from skilled PT services to modify and progress therapeutic interventions, address functional mobility deficits, address ROM deficits, address strength deficits, analyze and address soft tissue restrictions and analyze and cue movement patterns to attain remaining goals.      []  See Plan of Care  []  See progress note/recertification  []  See Discharge Summary         Progress towards goals / Updated goals:      PLAN  [x]  Upgrade activities as tolerated     []  Continue plan of care  []  Update interventions per flow sheet       []  Discharge due to:_  []  Other:_      Charmaine Bagley PTA 5/17/2018  9:03 AM

## 2018-05-18 ENCOUNTER — APPOINTMENT (OUTPATIENT)
Dept: PHYSICAL THERAPY | Age: 58
End: 2018-05-18
Payer: COMMERCIAL

## 2018-05-21 ENCOUNTER — HOSPITAL ENCOUNTER (OUTPATIENT)
Dept: PHYSICAL THERAPY | Age: 58
Discharge: HOME | End: 2018-05-21
Payer: COMMERCIAL

## 2018-05-21 PROCEDURE — 97110 THERAPEUTIC EXERCISES: CPT

## 2018-05-21 NOTE — PROGRESS NOTES
PT DAILY TREATMENT NOTE 8-    Patient Name: Rhona Infante  Date:2018  : 1960  [x]  Patient  Verified  Payor: BLUE CROSS / Plan:  [facility] / Product Type: PPO /    In time:11:31  Out time:12:21  Total Treatment Time (min): 50  Total Timed Codes (min): 50  1:1 Treatment Time (min):    Visit #: 3 of 8    Treatment Area: Left knee pain [M25.562]    SUBJECTIVE  Pain Level (0-10 scale): 2-3/10  Any medication changes, allergies to medications, adverse drug reactions, diagnosis change, or new procedure performed?: [x] No    [] Yes (see summary sheet for update)  Subjective functional status/changes:   [] No changes reported  Patient with pain first thing in morning as things are pretty stiff. Patient states that pain and stiffness improve as day progresses and does exercises.     OBJECTIVE  Modality rationale: decrease pain to improve the patients ability to perform gait and other dynamic movement activities   Min Type Additional Details    [] Estim: []Att   []Unatt        []TENS instruct                  []IFC  []Premod   []NMES                     []Other:  []w/US   []w/ice   []w/heat  Position:  Location:    []  Traction: [] Cervical       []Lumbar                       [] Prone          []Supine                       []Intermittent   []Continuous Lbs:  [] before manual  [] after manual    []  Ultrasound: []Continuous   [] Pulsed                           []1MHz   []3MHz Location:  W/cm2:    []  Iontophoresis with dexamethasone         Location: [] Take home patch   [] In clinic   10 [x]  Ice     []  heat  []  Ice massage Position: Hooklying  Location: Left knee    []  Vasopneumatic Device Pressure:       [] lo [] med [] hi   Temperature: [] lo [] med [] hi   [] Skin assessment post-treatment:  []intact []redness- no adverse reaction       []redness - adverse reaction:       32 min Therapeutic Exercise:  [x] See flow sheet :   Rationale: increase ROM and increase strength to improve the patients ability to perform gait and other dynamic movement activities    8 min Manual Therapy:  Left hip IR, flexor and hamstring MET   Rationale: increase ROM and increase tissue extensibility to perform gait and other dynamic movement activities      Other Objective/Functional Measures:     Pain Level (0-10 scale) post treatment: 1/10    ASSESSMENT/Changes in Function: Patient doing well with exercises. Will be adding mini-band monster and lateral walks at next treatment session. Patient will continue to benefit from skilled PT services to address strength deficits and analyze and address soft tissue restrictions to attain remaining goals.      PLAN  [x]  Upgrade activities as tolerated     [x]  Continue plan of care  []  Update interventions per flow sheet       []  Discharge due to:_  []  Other:_      Hardik Quevedo, PT 5/21/2018  1:05 PM

## 2018-05-23 ENCOUNTER — HOSPITAL ENCOUNTER (OUTPATIENT)
Dept: PHYSICAL THERAPY | Age: 58
Discharge: HOME | End: 2018-05-23
Payer: COMMERCIAL

## 2018-05-23 PROCEDURE — 97140 MANUAL THERAPY 1/> REGIONS: CPT

## 2018-05-23 PROCEDURE — 97110 THERAPEUTIC EXERCISES: CPT

## 2018-05-23 NOTE — PROGRESS NOTES
PT DAILY TREATMENT NOTE -    Patient Name: Cara Butt  Date:2018  : 1960  [x]  Patient  Verified  Payor: BLUE CROSS / Plan: [facility] / Product Type: PPO /    In time:3:02  Out time:4:05  Total Treatment Time (min): 63  Total Timed Codes (min): 53  1:1 Treatment Time (min):    Visit #: 4 of 8    Treatment Area: Left knee pain [M25.562]    SUBJECTIVE  Pain Level (0-10 scale): 1/10  Any medication changes, allergies to medications, adverse drug reactions, diagnosis change, or new procedure performed?: [x] No    [] Yes (see summary sheet for update)  Subjective functional status/changes:   [] No changes reported  Patient with continued pain and dysfunction with being sedentary. Tightness and function improves as he stretches and moves throughout the day.     OBJECTIVE  Modality rationale: decrease pain to improve the patients ability to perform gait and other dynamic movement activities   Min Type Additional Details    [] Estim: []Att   []Unatt        []TENS instruct                  []IFC  []Premod   []NMES                     []Other:  []w/US   []w/ice   []w/heat  Position:  Location:    []  Traction: [] Cervical       []Lumbar                       [] Prone          []Supine                       []Intermittent   []Continuous Lbs:  [] before manual  [] after manual    []  Ultrasound: []Continuous   [] Pulsed                           []1MHz   []3MHz Location:  W/cm2:    []  Iontophoresis with dexamethasone         Location: [] Take home patch   [] In clinic   10 [x]  Ice     []  heat  []  Ice massage Position: Hooklying  Location:Left knee    []  Vasopneumatic Device Pressure:       [] lo [] med [] hi   Temperature: [] lo [] med [] hi   [] Skin assessment post-treatment:  []intact []redness- no adverse reaction       []redness - adverse reaction:       45 min Therapeutic Exercise:  [x] See flow sheet :   Rationale: increase ROM, increase strength and improve coordination to improve the patients ability to perform gait and other dynamic movement activities    8 min Manual Therapy:  Left hip and knee PROM   Rationale: increase ROM and increase tissue extensibility to perform gait and other dynamic movement activities    Other Objective/Functional Measures:     Pain Level (0-10 scale) post treatment: 0/10    ASSESSMENT/Changes in Function: Patient with improved function and progressing his exercises. Patient continues to have pain to medial knee. Patient will continue to benefit from skilled PT services to address strength deficits and analyze and address soft tissue restrictions to attain remaining goals.      PLAN  [x]  Upgrade activities as tolerated     [x]  Continue plan of care  []  Update interventions per flow sheet       []  Discharge due to:_  []  Other:_      Anuj Brannon PT 5/23/2018  3:34 PM

## 2018-05-24 ENCOUNTER — OFFICE VISIT (OUTPATIENT)
Dept: ORTHOPEDIC SURGERY | Facility: CLINIC | Age: 58
End: 2018-05-24

## 2018-05-24 VITALS
BODY MASS INDEX: 25.63 KG/M2 | SYSTOLIC BLOOD PRESSURE: 120 MMHG | RESPIRATION RATE: 16 BRPM | WEIGHT: 189.2 LBS | HEART RATE: 86 BPM | TEMPERATURE: 98.1 F | OXYGEN SATURATION: 98 % | HEIGHT: 72 IN | DIASTOLIC BLOOD PRESSURE: 76 MMHG

## 2018-05-24 DIAGNOSIS — M17.12 PRIMARY OSTEOARTHRITIS OF LEFT KNEE: Primary | ICD-10-CM

## 2018-05-24 DIAGNOSIS — M65.9 SYNOVITIS OF KNEE: ICD-10-CM

## 2018-05-24 DIAGNOSIS — G89.29 CHRONIC PAIN OF LEFT KNEE: ICD-10-CM

## 2018-05-24 DIAGNOSIS — M25.562 CHRONIC PAIN OF LEFT KNEE: ICD-10-CM

## 2018-05-24 DIAGNOSIS — M25.462 EFFUSION OF LEFT KNEE: ICD-10-CM

## 2018-05-24 RX ORDER — BUPIVACAINE HYDROCHLORIDE 2.5 MG/ML
10 INJECTION, SOLUTION EPIDURAL; INFILTRATION; INTRACAUDAL ONCE
Qty: 10 ML | Refills: 0
Start: 2018-05-24 | End: 2018-05-24

## 2018-05-24 RX ORDER — HYALURONATE SODIUM 10 MG/ML
2 SYRINGE (ML) INTRAARTICULAR ONCE
Qty: 2 ML | Refills: 0
Start: 2018-05-24 | End: 2018-05-24

## 2018-05-24 NOTE — PROGRESS NOTES
Patient: Binu Saha                MRN: [id number]       SSN: [id number]  YOB: 1960        AGE: 62 y.o. SEX: male    PCP: Adrianne Borges MD  05/24/18    CC: KNEE EFFUSION AND LEFT KNEE OSTEOARTHRITIS    HISTORY:  Binu Saha is a 62 y.o. male who is seen for increased left knee pain. His last knee injection helped for about a week with incomplete relief. He reports pain for the past 2 months with no history of injury. He notes relief with OTC NSAIDs and pain relievers. He notes pain with standing and walking. He notes increased pain especially when kickboxing- especially when twisting on his left leg during support stance. He states the majority of the kicks involved planting his left leg and kicking with his right. He has tried a variety of conservative measures including cortisone injections, NSAIDs, and physical therapy. He reports pain standng, going up and down stairs, and walking. Pain Assessment  5/24/2018   Location of Pain Knee   Location Modifiers Left   Severity of Pain 1   Quality of Pain Aching   Duration of Pain A few hours   Frequency of Pain Intermittent   Aggravating Factors Bending;Straightening;Stairs; Walking   Limiting Behavior No   Relieving Factors Rest   Relieving Factors Comment -   Result of Injury No     Occupation, etc:  Mr. Fior Stover retired as an  at the [employer]. He retired about 1 year ago. He is originally from [facility] [place] [facility] but moved up here after graduating from college 35 years ago. He lives in [place] with his wife. He has two adult sons and 7 grandchildren. He has one son in the [place] and one in school at [employer]. He enjoys kickboxing for exercise and fitness. He states he has been participating in kickboxing for the past 7 years. Current weight is 185 pounds. He is 6' tall. He is not diabetic or hypertensive.      No results found for: HBA1C, HGBE8, PZA2RVOI, DUN5KVEE, SPC0IFLY  Weight Metrics 5/24/2018 4/26/2018 3/13/2018   Weight 189 lb 3.2 oz 185 lb 3.2 oz 184 lb   BMI 25.66 kg/m2 25.12 kg/m2 24.95 kg/m2       There is no problem list on file for this patient. REVIEW OF SYSTEMS: All Below are Negative except: See HPI   Constitutional: negative for fever, chills, and weight loss. Cardiovascular: negative for chest pain, claudication, leg swelling, SOB, ROLDAN   Gastrointestinal: Negative for pain, N/V/C/D, Blood in stool or urine, dysuria,  hematuria, incontinence, pelvic pain. Musculoskeletal: See HPI   Neurological: Negative for dizziness and weakness. Negative for headaches, Visual changes, confusion, seizures   Phychiatric/Behavioral: Negative for depression, memory loss, substance  abuse. Extremities: Negative for hair changes, rash, or skin lesion changes. Hematologic: Negative for bleeding problems, bruising, pallor or swollen lymph  nodes   Peripheral Vascular: No calf pain, no circulation deficits. Social History     Social History    Marital status:      Spouse name: N/A    Number of children: N/A    Years of education: N/A     Occupational History    Not on file. Social History Main Topics    Smoking status: Never Smoker    Smokeless tobacco: Never Used    Alcohol use No    Drug use: No    Sexual activity: Not on file     Other Topics Concern    Not on file     Social History Narrative      No Known Allergies   Current Outpatient Prescriptions   Medication Sig    simvastatin (ZOCOR) 20 mg tablet Take  by mouth nightly. No current facility-administered medications for this visit.        PHYSICAL EXAMINATION:  Visit Vitals    /76    Pulse 86    Temp 98.1 °F (36.7 °C) (Oral)    Resp 16    Ht 6' (1.829 m)    Wt 189 lb 3.2 oz (85.8 kg)    SpO2 98%    BMI 25.66 kg/m2      ORTHO EXAMINATION:  Examination Right knee Left knee   Skin Intact Intact   Range of motion 120-0 115-0   Effusion - +   Medial joint line tenderness + +   Lateral joint line tenderness - - Popliteal tenderness - -   Osteophytes palpable - +, medial   Dannys - -   Patella crepitus - +   Anterior drawer - -   Lateral laxity - -   Medial laxity - -   Varus deformity - -   Valgus deformity - -   Pretibial edema - -   Calf tenderness - -   Wearing a soft knee sleeve on his left knee    PROCEDURE:    After discussing treatment options, patient's left knee was injected with 2 cc of Euflexxa. Chart reviewed for the following:   Magnolia Guerrero MD, have reviewed the History, Physical and updated the Allergic reactions for [address] performed immediately prior to start of procedure:  Magnolia Guerrero MD, have performed the following reviews on [place] prior to the start of the procedure:            * Patient was identified by name and date of birth   * Agreement on procedure being performed was verified  * Risks and Benefits explained to the patient  * Procedure site verified and marked as necessary  * Patient was positioned for comfort  * Consent was obtained     Time: 5:30 PM     Date of procedure: 5/24/2018    Procedure performed by:  Gwen Montague MD    Mr. Ishan Wheeler tolerated the procedure well with no complications. RADIOGRAPHS:  XR LEFT KNEE 3/13/18  IMPRESSION:  Three views - No fractures, no effusion, moderate medial joint space narrowing, + osteophytes present. Sclerosis and small defect of the medial femoral condyles. IMPRESSION:      ICD-10-CM ICD-9-CM    1. Primary osteoarthritis of left knee M17.12 715.16 bupivacaine, PF, (MARCAINE, PF,) 0.25 % (2.5 mg/mL) injection      AL DRAIN/INJECT LARGE JOINT/BURSA      EUFLEXXA INJECTION PER DOSE      sodium hyaluronate (SUPARTZ FX/HYALGAN/GENIVSC) 10 mg/mL syrg injection   2.  Chronic pain of left knee M25.562 719.46 bupivacaine, PF, (MARCAINE, PF,) 0.25 % (2.5 mg/mL) injection    G89.29 338.29 AL DRAIN/INJECT LARGE JOINT/BURSA      EUFLEXXA INJECTION PER DOSE      sodium hyaluronate (SUPARTZ FX/HYALGAN/GENIVSC) 10 mg/mL syrg injection   3. Effusion of left knee M25.462 719.06 bupivacaine, PF, (MARCAINE, PF,) 0.25 % (2.5 mg/mL) injection      NM DRAIN/INJECT LARGE JOINT/BURSA   4. Synovitis of knee M65.9 727.09      PLAN:   After discussing treatment options, patient's left knee was injected with 2 cc of Euflexxa. He will follow up in one week to continue his Euflexxa treatment.      Scribed by Jenniffer Tracy ([address]) as dictated by Kev Tabor MD

## 2018-05-29 ENCOUNTER — HOSPITAL ENCOUNTER (OUTPATIENT)
Dept: PHYSICAL THERAPY | Age: 58
Discharge: HOME | End: 2018-05-29
Payer: COMMERCIAL

## 2018-05-29 PROCEDURE — 97110 THERAPEUTIC EXERCISES: CPT

## 2018-05-29 PROCEDURE — 97140 MANUAL THERAPY 1/> REGIONS: CPT

## 2018-05-29 NOTE — PROGRESS NOTES
PT DAILY TREATMENT NOTE 8-    Patient Name: Marley Motley  Date:2018  : 1960  [x]  Patient  Verified  Payor: BLUE CROSS / Plan: [address] [place] [address] [place] / Product Type: PPO /    In time:9:35  Out time:10:49  Total Treatment Time (min): 74  Total Timed Codes (min): 58  1:1 Treatment Time (min):    Visit #: 5 of 8    Treatment Area: Left knee pain [M25.562]    SUBJECTIVE  Pain Level (0-10 scale): 1/10  Any medication changes, allergies to medications, adverse drug reactions, diagnosis change, or new procedure performed?: [x] No    [] Yes (see summary sheet for update)  Subjective functional status/changes:   [] No changes reported  My knee has been feeling better overall, but sometimes it takes a little while to get it warmed up.     OBJECTIVE  Modality rationale: decrease inflammation and decrease pain to improve the patients ability to improve functional abilities   Min Type Additional Details    [] Estim: []Att   []Unatt        []TENS instruct                  []IFC  []Premod   []NMES                     []Other:  []w/US   []w/ice   []w/heat  Position:  Location:    []  Traction: [] Cervical       []Lumbar                       [] Prone          []Supine                       []Intermittent   []Continuous Lbs:  [] before manual  [] after manual    []  Ultrasound: []Continuous   [] Pulsed                           []1MHz   []3MHz Location:  W/cm2:    []  Iontophoresis with dexamethasone         Location: [] Take home patch   [] In clinic   10 [x]  Ice     []  heat  []  Ice massage Position:supine  Location:left knee    []  Vasopneumatic Device Pressure:       [] lo [] med [] hi   Temperature: [] lo [] med [] hi   [] Skin assessment post-treatment:  []intact []redness- no adverse reaction       []redness - adverse reaction:       56 min Therapeutic Exercise:  [x] See flow sheet :   Rationale: increase ROM, increase strength, improve balance and increase proprioception to improve the patients ability to improve functional abilities    8 min Manual Therapy:  Manual hip adductor and sidelying hip flexor/quadriceps combo stretching    Rationale: increase ROM and increase tissue extensibility to improve functional mobility           min Patient Education: [x] Review HEP    [] Progressed/Changed HEP based on:   [] positioning   [] body mechanics   [] transfers   [] heat/ice application        Other Objective/Functional Measures:     Pain Level (0-10 scale) post treatment: 1/10    ASSESSMENT/Changes in Function: Pt was able to advance to addition of SLS and STAR taps on foam as well as increased resistance with 4 way SLR's with min to mod challenge today. Will continue to progress/advance patient within current POC as tolerated with monitoring symptoms. Patient will continue to benefit from skilled PT services to modify and progress therapeutic interventions, address functional mobility deficits, address ROM deficits, address strength deficits, analyze and address soft tissue restrictions and analyze and cue movement patterns to attain remaining goals.      []  See Plan of Care  []  See progress note/recertification  []  See Discharge Summary         Progress towards goals / Updated goals:      PLAN  [x]  Upgrade activities as tolerated     []  Continue plan of care  []  Update interventions per flow sheet       []  Discharge due to:_  []  Other:_      Matt Logan PTA 5/29/2018  9:37 AM

## 2018-05-31 ENCOUNTER — OFFICE VISIT (OUTPATIENT)
Dept: ORTHOPEDIC SURGERY | Facility: CLINIC | Age: 58
End: 2018-05-31

## 2018-05-31 ENCOUNTER — HOSPITAL ENCOUNTER (OUTPATIENT)
Dept: PHYSICAL THERAPY | Age: 58
Discharge: HOME | End: 2018-05-31
Payer: COMMERCIAL

## 2018-05-31 VITALS
TEMPERATURE: 98.4 F | RESPIRATION RATE: 18 BRPM | WEIGHT: 183.4 LBS | HEART RATE: 93 BPM | SYSTOLIC BLOOD PRESSURE: 130 MMHG | OXYGEN SATURATION: 98 % | DIASTOLIC BLOOD PRESSURE: 83 MMHG | BODY MASS INDEX: 24.84 KG/M2 | HEIGHT: 72 IN

## 2018-05-31 DIAGNOSIS — M65.9 SYNOVITIS OF KNEE: ICD-10-CM

## 2018-05-31 DIAGNOSIS — M25.562 CHRONIC PAIN OF LEFT KNEE: ICD-10-CM

## 2018-05-31 DIAGNOSIS — G89.29 CHRONIC PAIN OF LEFT KNEE: ICD-10-CM

## 2018-05-31 DIAGNOSIS — M17.12 PRIMARY OSTEOARTHRITIS OF LEFT KNEE: Primary | ICD-10-CM

## 2018-05-31 PROCEDURE — 97110 THERAPEUTIC EXERCISES: CPT

## 2018-05-31 PROCEDURE — 97140 MANUAL THERAPY 1/> REGIONS: CPT

## 2018-05-31 RX ORDER — HYALURONATE SODIUM 10 MG/ML
2 SYRINGE (ML) INTRAARTICULAR ONCE
Qty: 2 ML | Refills: 0
Start: 2018-05-31 | End: 2018-05-31

## 2018-05-31 NOTE — PROGRESS NOTES
PT DAILY TREATMENT NOTE     Patient Name: Brigitte Thomas  Date:2018  : 1960  [x]  Patient  Verified  Payor: BLUE CROSS / Plan: [address] [place] [address] [place] / Product Type: PPO /    In time:8:06  Out time:9:15  Total Treatment Time (min): 69  Total Timed Codes (min): 59  1:1 Treatment Time (min):    Visit #: 6 of 8    Treatment Area: Left knee pain [M25.562]    SUBJECTIVE  Pain Level (0-10 scale): 1/10  Any medication changes, allergies to medications, adverse drug reactions, diagnosis change, or new procedure performed?: [x] No    [] Yes (see summary sheet for update)  Subjective functional status/changes:   [] No changes reported  Patient reports noted improvement to his knee as he didn't have nearly the pain or discomfort kickboxing from yesterday.     OBJECTIVE  Modality rationale: decrease pain to improve the patients ability to perform gait and other dynamic movement activities   Min Type Additional Details    [] Estim: []Att   []Unatt        []TENS instruct                  []IFC  []Premod   []NMES                     []Other:  []w/US   []w/ice   []w/heat  Position:  Location:    []  Traction: [] Cervical       []Lumbar                       [] Prone          []Supine                       []Intermittent   []Continuous Lbs:  [] before manual  [] after manual    []  Ultrasound: []Continuous   [] Pulsed                           []1MHz   []3MHz Location:  W/cm2:    []  Iontophoresis with dexamethasone         Location: [] Take home patch   [] In clinic   10 [x]  Ice     []  heat  []  Ice massage Position:Hooklying  Location: Left knee    []  Vasopneumatic Device Pressure:       [] lo [] med [] hi   Temperature: [] lo [] med [] hi   [] Skin assessment post-treatment:  []intact []redness- no adverse reaction       []redness - adverse reaction:       51 min Therapeutic Exercise:  [x] See flow sheet :   Rationale: increase ROM, increase strength and improve coordination to improve the patients ability to perform gait and other dynamic movement activities    8 min Manual Therapy:  Left hip and leg passive stretching with MET   Rationale: increase ROM and increase tissue extensibility to perform gait and other dynamic movement activities    Other Objective/Functional Measures:     Pain Level (0-10 scale) post treatment: 0/10    ASSESSMENT/Changes in Function: Patient has not altered or reduced his activities since pain began. Patient is making improvements with pain reduction and increased muscle control. Patient will continue to benefit from skilled PT services to address ROM deficits and analyze and address soft tissue restrictions to attain remaining goals.      PLAN  [x]  Upgrade activities as tolerated     [x]  Continue plan of care  []  Update interventions per flow sheet       []  Discharge due to:_  []  Other:_      Jaquelin Mason PT 5/31/2018  11:17 AM

## 2018-05-31 NOTE — PROGRESS NOTES
Patient: Reji Olmedo                MRN: [id number]       SSN: [id number]  YOB: 1960        AGE: 62 y.o. SEX: male  Body mass index is 24.87 kg/(m^2). PCP: Shira Gatica MD  05/31/18    Chief Complaint   Patient presents with    Knee Pain     Left     HISTORY:  Reji Olmedo is a 62 y.o. male who is seen for left knee pain. ICD-10-CM ICD-9-CM    1. Primary osteoarthritis of left knee M17.12 715.16 LA DRAIN/INJECT LARGE JOINT/BURSA      EUFLEXXA INJECTION PER DOSE      sodium hyaluronate (SUPARTZ FX/HYALGAN/GENIVSC) 10 mg/mL syrg injection   2. Chronic pain of left knee M25.562 719.46 LA DRAIN/INJECT LARGE JOINT/BURSA    G89.29 338.29 EUFLEXXA INJECTION PER DOSE      sodium hyaluronate (SUPARTZ FX/HYALGAN/GENIVSC) 10 mg/mL syrg injection   3. Synovitis of knee M65.9 727.09 LA DRAIN/INJECT LARGE JOINT/BURSA      EUFLEXXA INJECTION PER DOSE      sodium hyaluronate (SUPARTZ FX/HYALGAN/GENIVSC) 10 mg/mL syrg injection     PROCEDURE:  After discussing treatment options, Mr. Monique Horton left knee was injected with 2 cc of Euflexxa. Chart reviewed for the following:   Brittni Munoz MD, have reviewed the History, Physical and updated the Allergic reactions for [address] performed immediately prior to start of procedure:  Brittni Munoz MD, have performed the following reviews on Reji Olmedo prior to the start of the procedure:            * Patient was identified by name and date of birth   * Agreement on procedure being performed was verified  * Risks and Benefits explained to the patient  * Procedure site verified and marked as necessary  * Patient was positioned for comfort  * Consent was obtained     Time: 5:15 PM     Date of procedure: 5/31/2018    Procedure performed by:  Jennifer Mena MD    Mr. Samm Collins tolerated the procedure well with no complications. PLAN:  After discussing treatment options, patient's left knee was injected with 2 cc of Euflexxa. Mr. Rimma Bai will follow up in one week to complete his Euflexxa injection series.       Scribed by Cesilia Andujar[address]) as dictated by Marybeth Sarah MD

## 2018-05-31 NOTE — PATIENT INSTRUCTIONS
Knee Arthritis: Exercises  Your Care Instructions  Here are some examples of exercises for knee arthritis. Start each exercise slowly. Ease off the exercise if you start to have pain. Your doctor or physical therapist will tell you when you can start these exercises and which ones will work best for you. How to do the exercises  Knee flexion with heel slide    1. Lie on your back with your knees bent. 2. Slide your heel back by bending your affected knee as far as you can. Then hook your other foot around your ankle to help pull your heel even farther back. 3. Hold for about 6 seconds, then rest for up to 10 seconds. 4. Repeat 8 to 12 times. 5. Switch legs and repeat steps 1 through 4, even if only one knee is sore. Quad sets    1. Sit with your affected leg straight and supported on the floor or a firm bed. Place a small, rolled-up towel under your knee. Your other leg should be bent, with that foot flat on the floor. 2. Tighten the thigh muscles of your affected leg by pressing the back of your knee down into the towel. 3. Hold for about 6 seconds, then rest for up to 10 seconds. 4. Repeat 8 to 12 times. 5. Switch legs and repeat steps 1 through 4, even if only one knee is sore. Straight-leg raises to the front    1. Lie on your back with your good knee bent so that your foot rests flat on the floor. Your affected leg should be straight. Make sure that your low back has a normal curve. You should be able to slip your hand in between the floor and the small of your back, with your palm touching the floor and your back touching the back of your hand. 2. Tighten the thigh muscles in your affected leg by pressing the back of your knee flat down to the floor. Hold your knee straight. 3. Keeping the thigh muscles tight and your leg straight, lift your affected leg up so that your heel is about 12 inches off the floor. Hold for about 6 seconds, then lower slowly.   4. Relax for up to 10 seconds between repetitions. 5. Repeat 8 to 12 times. 6. Switch legs and repeat steps 1 through 5, even if only one knee is sore. Active knee flexion    1. Lie on your stomach with your knees straight. If your kneecap is uncomfortable, roll up a washcloth and put it under your leg just above your kneecap. 2. Lift the foot of your affected leg by bending the knee so that you bring the foot up toward your buttock. If this motion hurts, try it without bending your knee quite as far. This may help you avoid any painful motion. 3. Slowly move your leg up and down. 4. Repeat 8 to 12 times. 5. Switch legs and repeat steps 1 through 4, even if only one knee is sore. Quadriceps stretch (facedown)    1. Lie flat on your stomach, and rest your face on the floor. 2. Wrap a towel or belt strap around the lower part of your affected leg. Then use the towel or belt strap to slowly pull your heel toward your buttock until you feel a stretch. 3. Hold for about 15 to 30 seconds, then relax your leg against the towel or belt strap. 4. Repeat 2 to 4 times. 5. Switch legs and repeat steps 1 through 4, even if only one knee is sore. Stationary exercise bike    1. If you do not have a stationary exercise bike at home, you can find one to ride at your local health club or community center. 2. Adjust the height of the bike seat so that your knee is slightly bent when your leg is extended downward. If your knee hurts when the pedal reaches the top, you can raise the seat so that your knee does not bend as much. 3. Start slowly. At first, try to do 5 to 10 minutes of cycling with little to no resistance. Then increase your time and the resistance bit by bit until you can do 20 to 30 minutes without pain. 4. If you start to have pain, rest your knee until your pain gets back to the level that is normal for you. Or cycle for less time or with less effort. Follow-up care is a key part of your treatment and safety.  Be sure to make and go to all appointments, and call your doctor if you are having problems. It's also a good idea to know your test results and keep a list of the medicines you take. Where can you learn more? Go to http://cash-chad.info/. Enter C159 in the search box to learn more about \"Knee Arthritis: Exercises. \"  Current as of: March 21, 2017  Content Version: 11.4  © 2006-2017 [employer]. Care instructions adapted under license by [employer] (which disclaims liability or warranty for this information). If you have questions about a medical condition or this instruction, always ask your healthcare professional. [address] any warranty or liability for your use of this information. Hyaluronic Acid (By injection)   Hyaluronic Acid (cke-ss-qsk-ON-ate AS-id)  Treats severe pain in your knee due to osteoarthritis. Brand Name(s): Euflexxa, Gel-One, GelSyn-3, GenVisc 850, Hyalgan, Hymovis, Monovisc, Orthovisc, Supartz FX, Visco-3   There may be other brand names for this medicine. When This Medicine Should Not Be Used: This medicine is not right for everyone. You should not receive it if you had an allergic reaction to hyaluronic acid or if you have a bleeding disorder. How to Use This Medicine:   Injectable  · Your doctor will tell you how many injections you will need. This medicine is injected into your knee joint. · A nurse or other health provider will give you this medicine. Drugs and Foods to Avoid:      Ask your doctor or pharmacist before using any other medicine, including over-the-counter medicines, vitamins, and herbal products. Warnings While Using This Medicine:   · Tell your doctor if you are pregnant or breastfeeding, or if you have any allergies, including to birds, feathers, or eggs. · Rest your knee for 48 hours after you receive an injection. Do not do strenuous, weightbearing activities, such as jogging or tennis.  Avoid activities that keep you standing for longer than 1 hour. Possible Side Effects While Using This Medicine:   Call your doctor right away if you notice any of these side effects:  · Allergic reaction: Itching or hives, swelling in your face or hands, swelling or tingling in your mouth or throat, chest tightness, trouble breathing  If you notice these less serious side effects, talk with your doctor:   · Mild increase in pain or swelling in your knee  · Pain, redness, or swelling where the medicine is injected  If you notice other side effects that you think are caused by this medicine, tell your doctor. Call your doctor for medical advice about side effects. You may report side effects to FDA at 9-207-FDA-4622  © 2017 [facility] Information is for End User's use only and may not be sold, redistributed or otherwise used for commercial purposes. The above information is an  only. It is not intended as medical advice for individual conditions or treatments. Talk to your doctor, nurse or pharmacist before following any medical regimen to see if it is safe and effective for you.

## 2018-06-04 ENCOUNTER — HOSPITAL ENCOUNTER (OUTPATIENT)
Dept: PHYSICAL THERAPY | Age: 58
Discharge: HOME | End: 2018-06-04
Payer: COMMERCIAL

## 2018-06-04 PROCEDURE — 97110 THERAPEUTIC EXERCISES: CPT

## 2018-06-04 NOTE — PROGRESS NOTES
PT DAILY TREATMENT NOTE     Patient Name: Reji Olmedo  Date:2018  : 1960  [x]  Patient  Verified  Payor: BLUE CROSS / Plan: [employer] [facility] / Product Type: PPO /    In time:2:33  Out time:3:40  Total Treatment Time (min): 67  Total Timed Codes (min): 51  1:1 Treatment Time (min):    Visit #: 7 of 8    Treatment Area: Left knee pain [M25.562]    SUBJECTIVE  Pain Level (0-10 scale): 1/10  Any medication changes, allergies to medications, adverse drug reactions, diagnosis change, or new procedure performed?: [x] No    [] Yes (see summary sheet for update)  Subjective functional status/changes:   [] No changes reported  My knee has been doing pretty good, I still feels stiff and sore in the morning when I first get up and it works itself out as I get up and move around with warming it up.     OBJECTIVE  Modality rationale: decrease inflammation and decrease pain to improve the patients ability to improve functional abilities   Min Type Additional Details    [] Estim: []Att   []Unatt        []TENS instruct                  []IFC  []Premod   []NMES                     []Other:  []w/US   []w/ice   []w/heat  Position:  Location:    []  Traction: [] Cervical       []Lumbar                       [] Prone          []Supine                       []Intermittent   []Continuous Lbs:  [] before manual  [] after manual    []  Ultrasound: []Continuous   [] Pulsed                           []1MHz   []3MHz Location:  W/cm2:    []  Iontophoresis with dexamethasone         Location: [] Take home patch   [] In clinic   10 [x]  Ice     []  heat  []  Ice massage Position: supine  Location:left knee    []  Vasopneumatic Device Pressure:       [] lo [] med [] hi   Temperature: [] lo [] med [] hi   [] Skin assessment post-treatment:  []intact []redness- no adverse reaction       []redness - adverse reaction:       57 min Therapeutic Exercise:  [x] See flow sheet :   Rationale: increase ROM, increase strength, improve balance and increase proprioception to improve the patients ability to improve functional abilities         min Patient Education: [x] Review HEP    [] Progressed/Changed HEP based on:   [] positioning   [] body mechanics   [] transfers   [] heat/ice application        Other Objective/Functional Measures:     Pain Level (0-10 scale) post treatment: .5/10    ASSESSMENT/Changes in Function: Pt was able to advance to addition of bridges with SLR's bilaterally with moderate challenge today. Will review detailed progress/goals for physician update next treatment with continuing to progress/advance within current POC/protocol as tolerated. Patient will continue to benefit from skilled PT services to modify and progress therapeutic interventions, address functional mobility deficits, address ROM deficits, address strength deficits, analyze and address soft tissue restrictions and analyze and cue movement patterns to attain remaining goals.      []  See Plan of Care  []  See progress note/recertification  []  See Discharge Summary         Progress towards goals / Updated goals:      PLAN  [x]  Upgrade activities as tolerated     []  Continue plan of care  []  Update interventions per flow sheet       []  Discharge due to:_  []  Other:_      Miguel Eason, CESAR 6/4/2018  2:33 PM

## 2018-06-07 ENCOUNTER — OFFICE VISIT (OUTPATIENT)
Dept: ORTHOPEDIC SURGERY | Facility: CLINIC | Age: 58
End: 2018-06-07

## 2018-06-07 VITALS
SYSTOLIC BLOOD PRESSURE: 142 MMHG | HEIGHT: 72 IN | OXYGEN SATURATION: 99 % | DIASTOLIC BLOOD PRESSURE: 80 MMHG | TEMPERATURE: 97.9 F | HEART RATE: 84 BPM | WEIGHT: 186 LBS | BODY MASS INDEX: 25.19 KG/M2 | RESPIRATION RATE: 16 BRPM

## 2018-06-07 DIAGNOSIS — M17.12 PRIMARY OSTEOARTHRITIS OF LEFT KNEE: Primary | ICD-10-CM

## 2018-06-07 RX ORDER — CHOLECALCIFEROL (VITAMIN D3) 125 MCG
CAPSULE ORAL
COMMUNITY

## 2018-06-07 RX ORDER — HYALURONATE SODIUM 10 MG/ML
2 SYRINGE (ML) INTRAARTICULAR ONCE
Qty: 2 ML | Refills: 0
Start: 2018-06-07 | End: 2018-06-07

## 2018-06-07 NOTE — PROGRESS NOTES
Patient: Arron Hodgkins                MRN: [id number]       SSN: [id number]  YOB: 1960        AGE: 62 y.o. SEX: male  Body mass index is 25.23 kg/(m^2). PCP: Silvia Hernandez MD  06/07/18    Chief Complaint   Patient presents with    Knee Pain     L KNEE PAIN, F/U APPT. HISTORY:  Arron Hodgkins is a 62 y.o. male who is seen for left knee pain. ICD-10-CM ICD-9-CM    1. Primary osteoarthritis of left knee M17.12 715.16 naproxen sodium (ALEVE) 220 mg cap      MN DRAIN/INJECT LARGE JOINT/BURSA      EUFLEXXA INJECTION PER DOSE      sodium hyaluronate (SUPARTZ FX/HYALGAN/GENIVSC) 10 mg/mL syrg injection     PROCEDURE:  After discussing treatment options, Mr. Carlos Carvajal left knee was injected with 2 cc of Euflexxa using anterior medial interarticular approach    Lot #[id number]  EXP: 04-    Chart reviewed for the following:   uRdy THAYER PA-C, have reviewed the History, Physical and updated the Allergic reactions for [employer] performed immediately prior to start of procedure:  Rudy THAYER PA-C, have performed the following reviews on Arron Hodgkins prior to the start of the procedure:            * Patient was identified by name and date of birth   * Agreement on procedure being performed was verified  * Risks and Benefits explained to the patient  * Procedure site verified and marked as necessary  * Patient was positioned for comfort  * Consent was obtained     Time: 2:10 PM     Date of procedure: 6/7/2018    Procedure performed by:  Ephraim Alejandre PA-C    Mr. Rimma Bai tolerated the procedure well with no complications. PLAN:  After discussing treatment options, patient's left knee was injected with 2 cc of Euflexxa.   Mr. Rimma Bai will follow up in six weeks, He will continue PT as previous, all questions answered to his satisfaction

## 2018-06-08 ENCOUNTER — HOSPITAL ENCOUNTER (OUTPATIENT)
Dept: PHYSICAL THERAPY | Age: 58
Discharge: HOME | End: 2018-06-08
Payer: COMMERCIAL

## 2018-06-08 PROCEDURE — 97110 THERAPEUTIC EXERCISES: CPT

## 2018-06-08 NOTE — PROGRESS NOTES
[facility] AT [address], [address] [place] [address] Road  Phone: [phone]   [phone]  DISCHARGE SUMMARY  Patient Name: Puja Leon : 1960   Treatment/Medical Diagnosis: Left knee pain [M25.562]   Referral Source: Jamie Rios MD     Date of Initial Visit: 18 Attended Visits: 8 Missed Visits: 1     SUMMARY OF TREATMENT  Therapeutic exercise for left LE/knee focused flexibility and strengthening, LE biomechanical symmetry, proprioceptive/balance awareness, manual intervention, cryotherapy and HEP. CURRENT STATUS  Patient reports approximately 75% overall improvement from therapy since initial evaluation with 1/10 pain level on average, increased to 2/10 at the worst in the morning upon rising, descending>ascending stairs and getting in/out of his car. Pt has made excellent progress with gaining knee mobility as well as advancing with hip and knee strengthening with advancing within current POC. Pt agrees that has has achieved maximum medical benefit/potenial from current POC after attending 8 of 8 prescribed visits and is appropriate for discharge from therapy at this time. Pt was given and instructed in an updated HEP for continued self maintenance of reduced sxs after D/C from therapy. Left LE strength measurements/MMT= Hip= Flexion=5/5; Abduction=5/5; Extension=4+/5; Glut Medius=5/5 Knee= Quads=5/5; Hamstrings=5/5  Goal/Measure of Progress Goal Met? 1. Improve left hip strength to 5/5 to improve muscle control for ADL's   Status at last Eval: Progressing  Current Status: Partially Met, except for hip extension strength which is 4+/5 Partially Met   2. Pt will report at least 75% functional improvement with kickboxing   Status at last Eval: Progressing  Current Status: 50% improvement reported no   3.   Pt will report at least 50% functional improvement with stair climbing   Status at last Eval: Progressing  Current Status: 60% improvement yes   4. Improve FOTO outcome score by 18% to indicate a significant improvement in ADL function   Status at last Eval: 58/100 Current Status: 69/100 no     RECOMMENDATIONS  Patient feels that he has achieved maximum medical benefit/potential from current POC after completing 8 of 8 prescribed visits and is ready for discharge from therapy at this time. If you have any questions/comments please contact us directly at [phone]. Thank you for allowing us to assist in the care of your patient.     LPTA Signature: Jenny Velarde PTA Date: 6/8/18   Therapist Signature:  Time: 8:21 AM

## 2018-06-08 NOTE — PROGRESS NOTES
PT DAILY TREATMENT NOTE     Patient Name: Lisa Mondragon  Date:2018  : 1960  [x]  Patient  Verified  Payor: BLUE CROSS / Plan:  [facility] / Product Type: PPO /    In time:8:07  Out time:9:18  Total Treatment Time (min): 71  Total Timed Codes (min): 65  1:1 Treatment Time (min):    Visit #: 8 of 8    Treatment Area: Left knee pain [M25.562]    SUBJECTIVE  Pain Level (0-10 scale): 1/10  Any medication changes, allergies to medications, adverse drug reactions, diagnosis change, or new procedure performed?: [x] No    [] Yes (see summary sheet for update)  Subjective functional status/changes:   [] No changes reported  I still have the usual nagging pain when I am moving it certain ways and going up and down the stairs, but I have been doing the exercises at home and it has been feeling pretty good otherwise. OBJECTIVE      71 min Therapeutic Exercise:  [x] See flow sheet :   Rationale: increase ROM, increase strength, improve balance and increase proprioception to improve the patients ability to improve functional abilities           min Patient Education: [x] Review HEP    [] Progressed/Changed HEP based on:   [] positioning   [] body mechanics   [] transfers   [] heat/ice application        Other Objective/Functional Measures:     Pain Level (0-10 scale) post treatment: 0    ASSESSMENT/Changes in Function:   []  See Plan of Care  []  See progress note/recertification  [x]  See Discharge Summary         Progress towards goals / Updated goals:  See discharge summary for full final detailed progress towards all established goals. PLAN  []  Upgrade activities as tolerated     []  Continue plan of care  []  Update interventions per flow sheet       [x]  Discharge due to:Patient feels that he has achieved maximum medical benefit/potential from current POC after completing 8 of 8 prescribed visits and is ready for discharge from therapy at this time.   []  Other:_      Elham Trevino, PTA 6/8/2018  8:08 AM

## 2018-06-11 ENCOUNTER — APPOINTMENT (OUTPATIENT)
Dept: PHYSICAL THERAPY | Age: 58
End: 2018-06-11
Payer: COMMERCIAL

## 2018-07-20 ENCOUNTER — OFFICE VISIT (OUTPATIENT)
Dept: ORTHOPEDIC SURGERY | Facility: CLINIC | Age: 58
End: 2018-07-20

## 2018-07-20 VITALS
SYSTOLIC BLOOD PRESSURE: 125 MMHG | WEIGHT: 186.4 LBS | RESPIRATION RATE: 16 BRPM | HEIGHT: 72 IN | BODY MASS INDEX: 25.25 KG/M2 | DIASTOLIC BLOOD PRESSURE: 67 MMHG | HEART RATE: 80 BPM | OXYGEN SATURATION: 99 % | TEMPERATURE: 97.5 F

## 2018-07-20 DIAGNOSIS — M17.12 PRIMARY OSTEOARTHRITIS OF LEFT KNEE: Primary | ICD-10-CM

## 2018-07-20 NOTE — PROGRESS NOTES
HISTORY OF PRESENT ILLNESS:   Valerie Munoz returns. He is now completed six weeks ago his course of viscosupplementation in the form of Euflexxa. He is doing very well today. He is extremely pleased with his outcome to date. He has decreased pain overall into the left knee and has been able to increase his exercise routine as well as his activities of daily living which were significantly limited secondary to pain prior to his Euflexxa treatments. PHYSICAL EXAMINATION:  The range of motion of the left knee today is excellent, 120 degrees of flexion against resistance. He has full extension. No instability on varus or valgus stress testing although there is some slight crepitus through ranging with patella tracking midline. PLAN:  The patient is going to follow back on a prn basis. We discussed the duration of action of the Euflexxa and I indicated it was patient specific. Of course the range could be months to years and that I have seen personally in the past.  Today, all of his questions were answered to his satisfaction.

## 2019-06-06 ENCOUNTER — OFFICE VISIT (OUTPATIENT)
Dept: ORTHOPEDIC SURGERY | Facility: CLINIC | Age: 59
End: 2019-06-06

## 2019-06-06 VITALS
OXYGEN SATURATION: 98 % | TEMPERATURE: 97.8 F | HEART RATE: 80 BPM | SYSTOLIC BLOOD PRESSURE: 104 MMHG | WEIGHT: 179.2 LBS | RESPIRATION RATE: 16 BRPM | DIASTOLIC BLOOD PRESSURE: 69 MMHG | HEIGHT: 72 IN | BODY MASS INDEX: 24.27 KG/M2

## 2019-06-06 DIAGNOSIS — M25.562 CHRONIC PAIN OF LEFT KNEE: ICD-10-CM

## 2019-06-06 DIAGNOSIS — M17.12 PRIMARY OSTEOARTHRITIS OF LEFT KNEE: Primary | ICD-10-CM

## 2019-06-06 DIAGNOSIS — G89.29 CHRONIC PAIN OF LEFT KNEE: ICD-10-CM

## 2019-06-06 NOTE — PROGRESS NOTES
Patient: Shaji Salazar                MRN: [id number]       SSN: [id number]  YOB: 1960        AGE: 61 y.o. SEX: male    PCP: Brandi Sanchez MD  06/06/19    CC: LEFT KNEE OSTEOARTHRITIS    HISTORY:  Shaji Salazar is a 61 y.o. male who is seen for intermittent left knee pain. He completed a successful Euflexxa series in June 2018 that provided a year of relief. He experiences left knee stiffness. He sometimes feels a twinge in his left knee with certain movements. He notes relief with OTC NSAIDs and pain relievers. He notes increased some pain when kickboxing- especially when twisting on his left leg during support stance. He states the majority of the kicks involved planting his left leg and kicking with his right. He has tried a variety of conservative measures including cortisone injections, NSAIDs, and physical therapy. Pain Assessment  6/6/2019   Location of Pain Knee   Location Modifiers Left   Severity of Pain 0   Quality of Pain -   Quality of Pain Comment -   Duration of Pain -   Duration of Pain Comment -   Frequency of Pain -   Aggravating Factors -   Aggravating Factors Comment -   Limiting Behavior -   Relieving Factors -   Relieving Factors Comment -   Result of Injury -     Occupation, etc:  Mr. Castillo retired as an  at the [employer]. He retired about 1 year ago. He is originally from [place] but moved up here after graduating from college 35 years ago. He lives in [place] with his wife. He has two adult sons and 7 grandchildren. He has one son in the [place] and one in school at [employer]. He enjoys kickboxing for exercise and fitness. He states he has been participating in kickboxing for the past 7 years. He goes to the gym twice a week. Current weight is 185 pounds. He is 6' tall. He is not diabetic or hypertensive.      No results found for: HBA1C, HGBE8, AWG9AJVZ, AOI1KMHL, IJJ4CHSH  Weight Metrics 6/6/2019 3/12/2019 12/3/2018 7/20/2018 6/7/2018 5/31/2018 5/24/2018   Weight 179 lb 3.2 oz 180 lb 180 lb 186 lb 6.4 oz 186 lb 183 lb 6.4 oz 189 lb 3.2 oz   BMI 24.3 kg/m2 24.41 kg/m2 24.41 kg/m2 25.28 kg/m2 25.23 kg/m2 24.87 kg/m2 25.66 kg/m2       Patient Active Problem List   Diagnosis Code    Colon polyp K63.5    High cholesterol E78.00     REVIEW OF SYSTEMS: All Below are Negative except: See HPI   Constitutional: negative for fever, chills, and weight loss. Cardiovascular: negative for chest pain, claudication, leg swelling, SOB, ROLDAN   Gastrointestinal: Negative for pain, N/V/C/D, Blood in stool or urine, dysuria,  hematuria, incontinence, pelvic pain. Musculoskeletal: See HPI   Neurological: Negative for dizziness and weakness. Negative for headaches, Visual changes, confusion, seizures   Phychiatric/Behavioral: Negative for depression, memory loss, substance  abuse. Extremities: Negative for hair changes, rash, or skin lesion changes. Hematologic: Negative for bleeding problems, bruising, pallor or swollen lymph  nodes   Peripheral Vascular: No calf pain, no circulation deficits.     Social History     Socioeconomic History    Marital status:      Spouse name: Not on file    Number of children: Not on file    Years of education: Not on file    Highest education level: Not on file   Occupational History    Not on file   Social Needs    Financial resource strain: Not on file    Food insecurity:     Worry: Not on file     Inability: Not on file    Transportation needs:     Medical: Not on file     Non-medical: Not on file   Tobacco Use    Smoking status: Never Smoker    Smokeless tobacco: Never Used   Substance and Sexual Activity    Alcohol use: No     Frequency: Never    Drug use: No    Sexual activity: Yes     Partners: Female   Lifestyle    Physical activity:     Days per week: Not on file     Minutes per session: Not on file    Stress: Not on file   Relationships    Social connections:     Talks on phone: Not on file     Gets together: Not on file     Attends [employer] service: Not on file     Active member of club or organization: Not on file     Attends meetings of clubs or organizations: Not on file     Relationship status: Not on file    Intimate partner violence:     Fear of current or ex partner: Not on file     Emotionally abused: Not on file     Physically abused: Not on file     Forced sexual activity: Not on file   Other Topics Concern    Not on file   Social History Narrative    ** Merged History Encounter **           No Known Allergies   Current Outpatient Medications   Medication Sig    OTHER,NON-FORMULARY, Tadalafil 6 mg tablets, patient to take 1 tab PO daily. Max 1 tab daily. Fax [phone]    simvastatin (ZOCOR) 20 mg tablet TK 1 T PO QHS    multivitamin (ONE A DAY) tablet Take 1 Tab by mouth daily.  naproxen sodium (ALEVE) 220 mg cap Take  by mouth.  simvastatin (ZOCOR) 20 mg tablet Take  by mouth nightly. No current facility-administered medications for this visit. PHYSICAL EXAMINATION:  Visit Vitals  /69   Pulse 80   Temp 97.8 °F (36.6 °C) (Oral)   Resp 16   Ht 6' (1.829 m)   Wt 179 lb 3.2 oz (81.3 kg)   SpO2 98%   BMI 24.30 kg/m²      ORTHO EXAMINATION:  Examination Right knee Left knee   Skin Intact Intact   Range of motion 120-0 115-0   Effusion - +   Medial joint line tenderness + +   Lateral joint line tenderness - -   Popliteal tenderness - -   Osteophytes palpable - +, medial   Dannys - -   Patella crepitus - +   Anterior drawer - -   Lateral laxity - -   Medial laxity - -   Varus deformity - -   Valgus deformity - -   Pretibial edema - -   Calf tenderness - -     RADIOGRAPHS:  XR LEFT KNEE 3/13/18  IMPRESSION:  Three views - No fractures, no effusion, moderate medial joint space narrowing, + osteophytes present. Sclerosis and small defect of the medial femoral condyles. IMPRESSION:      ICD-10-CM ICD-9-CM    1.  Primary osteoarthritis of left knee M17.12 715.16 PROCEDURE AUTHORIZATION TO    2. Chronic pain of left knee M25.562 719.46 PROCEDURE AUTHORIZATION TO     G89.29 338.29      PLAN:   There is no need for surgery at this time. Consider visco supplementation if pain continues. He will follow up as needed.      Scribed by Flori Burch) as dictated by Yessica Hawley MD

## 2019-08-12 ENCOUNTER — OFFICE VISIT (OUTPATIENT)
Dept: ORTHOPEDIC SURGERY | Facility: CLINIC | Age: 59
End: 2019-08-12

## 2019-08-12 VITALS
SYSTOLIC BLOOD PRESSURE: 124 MMHG | DIASTOLIC BLOOD PRESSURE: 72 MMHG | HEART RATE: 78 BPM | TEMPERATURE: 97.5 F | HEIGHT: 72 IN | WEIGHT: 181.6 LBS | BODY MASS INDEX: 24.6 KG/M2 | RESPIRATION RATE: 18 BRPM | OXYGEN SATURATION: 99 %

## 2019-08-12 DIAGNOSIS — M25.562 CHRONIC PAIN OF LEFT KNEE: ICD-10-CM

## 2019-08-12 DIAGNOSIS — G89.29 CHRONIC PAIN OF LEFT KNEE: ICD-10-CM

## 2019-08-12 DIAGNOSIS — M17.12 PRIMARY OSTEOARTHRITIS OF LEFT KNEE: Primary | ICD-10-CM

## 2019-08-12 RX ORDER — HYALURONATE SODIUM 10 MG/ML
2 SYRINGE (ML) INTRAARTICULAR ONCE
Qty: 2 ML | Refills: 0
Start: 2019-08-12 | End: 2019-08-12

## 2019-08-12 NOTE — PROGRESS NOTES
Patient: Amy Steele                MRN: [id number]       SSN: [id number]  YOB: 1960        AGE: 61 y.o. SEX: male    PCP: Gema Sainz MD  08/12/19    CC: LEFT KNEE PAIN    HISTORY:  Amy Steele is a 61 y.o. male who is seen for left knee pain. He states his left knee has been doing well recently. He does not recall any recent knee injury. He completed a successful Euflexxa series in June 2018 that provided a year of relief. He experiences left knee stiffness. He sometimes feels a twinge in his left knee with certain movements. He notes relief with OTC NSAIDs and pain relievers. He notes increased some pain when kickboxing- especially when twisting on his left leg during support stance. He states the majority of the kicks involved planting his left leg and kicking with his right. He has tried a variety of conservative measures including cortisone injections, NSAIDs, and physical therapy. Pain Assessment  8/12/2019   Location of Pain Knee   Location Modifiers Left   Severity of Pain 0   Quality of Pain -   Quality of Pain Comment -   Duration of Pain -   Duration of Pain Comment -   Frequency of Pain -   Aggravating Factors -   Aggravating Factors Comment -   Limiting Behavior -   Relieving Factors -   Relieving Factors Comment -   Result of Injury -     Occupation, etc:  Mr. Adilson Quinn retired as an  at the [employer]. He retired about 1 year ago. He is originally from [place] but moved up here after graduating from college 35 years ago. He lives in [place] with his wife. He has two adult sons and 7 grandchildren. He has one son in the [employer] and one in school at [employer]. He enjoys kickboxing for exercise and fitness. He states he has been participating in kickboxing for the past 7 years. He goes to the gym twice a week. Current weight is 181 pounds. He is 6' tall. He is not diabetic or hypertensive.      No results found for: HBA1C, HGBE8, [address], FWN2RTHL, MOP3AUQD  Weight Metrics 8/12/2019 6/6/2019 3/12/2019 12/3/2018 7/20/2018 6/7/2018 5/31/2018   Weight 181 lb 9.6 oz 179 lb 3.2 oz 180 lb 180 lb 186 lb 6.4 oz 186 lb 183 lb 6.4 oz   BMI 24.63 kg/m2 24.3 kg/m2 24.41 kg/m2 24.41 kg/m2 25.28 kg/m2 25.23 kg/m2 24.87 kg/m2       Patient Active Problem List   Diagnosis Code    Colon polyp K63.5    High cholesterol E78.00     REVIEW OF SYSTEMS: All Below are Negative except: See HPI   Constitutional: negative for fever, chills, and weight loss. Cardiovascular: negative for chest pain, claudication, leg swelling, SOB, ROLDAN   Gastrointestinal: Negative for pain, N/V/C/D, Blood in stool or urine, dysuria,  hematuria, incontinence, pelvic pain. Musculoskeletal: See HPI   Neurological: Negative for dizziness and weakness. Negative for headaches, Visual changes, confusion, seizures   Phychiatric/Behavioral: Negative for depression, memory loss, substance  abuse. Extremities: Negative for hair changes, rash, or skin lesion changes. Hematologic: Negative for bleeding problems, bruising, pallor or swollen lymph  nodes   Peripheral Vascular: No calf pain, no circulation deficits.     Social History     Socioeconomic History    Marital status:      Spouse name: Not on file    Number of children: Not on file    Years of education: Not on file    Highest education level: Not on file   Occupational History    Not on file   Social Needs    Financial resource strain: Not on file    Food insecurity:     Worry: Not on file     Inability: Not on file    Transportation needs:     Medical: Not on file     Non-medical: Not on file   Tobacco Use    Smoking status: Never Smoker    Smokeless tobacco: Never Used   Substance and Sexual Activity    Alcohol use: No     Frequency: Never    Drug use: No    Sexual activity: Yes     Partners: Female   Lifestyle    Physical activity:     Days per week: Not on file     Minutes per session: Not on file    Stress: Not on file   Relationships    Social connections:     Talks on phone: Not on file     Gets together: Not on file     Attends Denominational service: Not on file     Active member of club or organization: Not on file     Attends meetings of clubs or organizations: Not on file     Relationship status: Not on file    Intimate partner violence:     Fear of current or ex partner: Not on file     Emotionally abused: Not on file     Physically abused: Not on file     Forced sexual activity: Not on file   Other Topics Concern    Not on file   Social History Narrative    ** Merged History Encounter **           No Known Allergies   Current Outpatient Medications   Medication Sig    OTHER,NON-FORMULARY, Tadalafil 6 mg tablets, patient to take 1 tab PO daily. Max 1 tab daily. Fax [phone]    multivitamin (ONE A DAY) tablet Take 1 Tab by mouth daily.  simvastatin (ZOCOR) 20 mg tablet Take  by mouth nightly.  simvastatin (ZOCOR) 20 mg tablet TK 1 T PO QHS    naproxen sodium (ALEVE) 220 mg cap Take  by mouth. No current facility-administered medications for this visit.        PHYSICAL EXAMINATION:  Visit Vitals  /72   Pulse 78   Temp 97.5 °F (36.4 °C) (Oral)   Resp 18   Ht 6' (1.829 m)   Wt 181 lb 9.6 oz (82.4 kg)   SpO2 99%   BMI 24.63 kg/m²      ORTHO EXAMINATION:  Examination Right knee Left knee   Skin Intact Intact   Range of motion 120-0 115-0   Effusion - +   Medial joint line tenderness - +   Lateral joint line tenderness - -   Popliteal tenderness - -   Osteophytes palpable - +, medial   Dannys - -   Patella crepitus - +   Anterior drawer - -   Lateral laxity - -   Medial laxity - -   Varus deformity - +   Valgus deformity - -   Pretibial edema - -   Calf tenderness - -     TIME OUT:  Chart reviewed for the following:   Michael THAYER MD, have reviewed the History, Physical and updated the Allergic reactions for [facility]   TIME OUT performed immediately prior to start of procedure:  Alfred Patterson MD, have performed the following reviews on Franky Olmedo prior to the start of the procedure:          * Patient was identified by name and date of birth   * Agreement on procedure being performed was verified  * Risks and Benefits explained to the patient  * Procedure site verified and marked as necessary  * Patient was positioned for comfort  * Consent was obtained     Time: 9:30 AM     Date of procedure: 8/12/2019  Procedure performed by:  Lizbeth Mancini MD  Mr. Ginger Thompson tolerated the procedure well with no complications. RADIOGRAPHS:  XR LEFT KNEE 3/13/18  IMPRESSION:  Three views - No fractures, no effusion, moderate medial joint space narrowing, + osteophytes present. Sclerosis and small defect of the medial femoral condyles. IMPRESSION:      ICD-10-CM ICD-9-CM    1. Primary osteoarthritis of left knee M17.12 715.16 VT DRAIN/INJECT LARGE JOINT/BURSA      EUFLEXXA INJECTION PER DOSE      sodium hyaluronate (SUPARTZ FX/HYALGAN/GENIVSC) 10 mg/mL syrg injection   2. Chronic pain of left knee M25.562 719.46 VT DRAIN/INJECT LARGE JOINT/BURSA    G89.29 338.29 EUFLEXXA INJECTION PER DOSE      sodium hyaluronate (SUPARTZ FX/HYALGAN/GENIVSC) 10 mg/mL syrg injection     PLAN:  After discussing treatment options, patient's left knee was injected with 2 cc Euflexxa. There is no need for surgery at this time. He will follow up in one week for Euflexxa #2.      Scribed by Gita Harris) as dictated by Lizbeth Mancini MD

## 2019-08-19 ENCOUNTER — OFFICE VISIT (OUTPATIENT)
Dept: ORTHOPEDIC SURGERY | Facility: CLINIC | Age: 59
End: 2019-08-19

## 2019-08-19 VITALS
RESPIRATION RATE: 16 BRPM | HEIGHT: 72 IN | TEMPERATURE: 97.2 F | WEIGHT: 184 LBS | BODY MASS INDEX: 24.92 KG/M2 | OXYGEN SATURATION: 97 % | HEART RATE: 74 BPM | DIASTOLIC BLOOD PRESSURE: 79 MMHG | SYSTOLIC BLOOD PRESSURE: 131 MMHG

## 2019-08-19 DIAGNOSIS — M17.12 PRIMARY OSTEOARTHRITIS OF LEFT KNEE: Primary | ICD-10-CM

## 2019-08-19 DIAGNOSIS — M25.562 CHRONIC PAIN OF LEFT KNEE: ICD-10-CM

## 2019-08-19 DIAGNOSIS — G89.29 CHRONIC PAIN OF LEFT KNEE: ICD-10-CM

## 2019-08-19 RX ORDER — HYALURONATE SODIUM 10 MG/ML
2 SYRINGE (ML) INTRAARTICULAR ONCE
Qty: 2 ML | Refills: 0
Start: 2019-08-19 | End: 2019-08-19

## 2019-08-19 NOTE — PROGRESS NOTES
1. Have you been to the ER, urgent care clinic since your last visit? Hospitalized since your last visit? NO    2. Have you seen or consulted any other health care providers outside of the [address] [place] [address] since your last visit? Include any pap smears or colon screening.  NO

## 2019-08-19 NOTE — PROGRESS NOTES
Patient: Blanquita Key                MRN: [id number]       SSN: [id number]  YOB: 1960        AGE: 61 y.o. SEX: male  Body mass index is 24.95 kg/m². PCP: Jenny Clark MD  08/19/19    Chief Complaint   Patient presents with    Knee Pain     left knee pain, f/u appt. HISTORY:  Blanquita Key is a 61 y.o. male who is seen for left knee pain. ICD-10-CM ICD-9-CM    1. Primary osteoarthritis of left knee M17.12 715.16 SD DRAIN/INJECT LARGE JOINT/BURSA      EUFLEXXA INJECTION PER DOSE      sodium hyaluronate (SUPARTZ FX/HYALGAN/GENIVSC) 10 mg/mL syrg injection   2. Chronic pain of left knee M25.562 719.46 SD DRAIN/INJECT LARGE JOINT/BURSA    G89.29 338.29 EUFLEXXA INJECTION PER DOSE      sodium hyaluronate (SUPARTZ FX/HYALGAN/GENIVSC) 10 mg/mL syrg injection     PROCEDURE:  After discussing treatment options, Mr. Dheeraj Lugo left knee was injected with 2 cc of Euflexxa. Chart reviewed for the following:   Jamel Cha MD, have reviewed the History, Physical and updated the Allergic reactions for [address] performed immediately prior to start of procedure:  Jamel Cha MD, have performed the following reviews on Blanquita Key prior to the start of the procedure:            * Patient was identified by name and date of birth   * Agreement on procedure being performed was verified  * Risks and Benefits explained to the patient  * Procedure site verified and marked as necessary  * Patient was positioned for comfort  * Consent was obtained     Time: 9:25 AM    Date of procedure: 8/19/2019    Procedure performed by:  Zoltan Schulte MD    Mr. Rmaan Sesay tolerated the procedure well with no complications. PLAN:  After discussing treatment options, patient's left knee was injected with 2 cc of Euflexxa. Mr. Raman Sesay will follow up in one week to continue his visco supplementation injection series.       Scribed by Keegan Villalta ([address]) as dictated by Nani Brand Maeve Berumen MD

## 2019-08-26 ENCOUNTER — OFFICE VISIT (OUTPATIENT)
Dept: ORTHOPEDIC SURGERY | Facility: CLINIC | Age: 59
End: 2019-08-26

## 2019-08-26 VITALS
WEIGHT: 181.6 LBS | HEIGHT: 72 IN | SYSTOLIC BLOOD PRESSURE: 112 MMHG | HEART RATE: 85 BPM | DIASTOLIC BLOOD PRESSURE: 73 MMHG | RESPIRATION RATE: 18 BRPM | OXYGEN SATURATION: 99 % | TEMPERATURE: 97.5 F | BODY MASS INDEX: 24.6 KG/M2

## 2019-08-26 DIAGNOSIS — M25.562 CHRONIC PAIN OF LEFT KNEE: ICD-10-CM

## 2019-08-26 DIAGNOSIS — M17.12 PRIMARY OSTEOARTHRITIS OF LEFT KNEE: Primary | ICD-10-CM

## 2019-08-26 DIAGNOSIS — G89.29 CHRONIC PAIN OF LEFT KNEE: ICD-10-CM

## 2019-08-26 RX ORDER — HYALURONATE SODIUM 10 MG/ML
2 SYRINGE (ML) INTRAARTICULAR ONCE
Qty: 2 ML | Refills: 0
Start: 2019-08-26 | End: 2019-08-26

## 2019-08-26 NOTE — PROGRESS NOTES
Patient: Yisel Turner                MRN: [id number]       SSN: [id number]  YOB: 1960        AGE: 61 y.o. SEX: male  Body mass index is 24.63 kg/m². PCP: Lien Armstrong MD  08/26/19    Chief Complaint   Patient presents with    Knee Pain     Left     HISTORY:  Yisel Turner is a 61 y.o. male who is seen for left knee pain. PROCEDURE:  After discussing treatment options, Mr. Tashi Perez left knee was injected with 2 cc of Euflexxa. TIME OUT performed immediately prior to start of procedure:  Radha Huertas MD, have performed the following reviews on Authur Ambrosia prior to the start of the procedure:            * Patient was identified by name and date of birth   * Agreement on procedure being performed was verified  * Risks and Benefits explained to the patient  * Procedure site verified and marked as necessary  * Patient was positioned for comfort  * Consent was obtained     Time: 9:25 AM     Date of procedure: 8/26/2019    Procedure performed by:  Jarrett Reyna MD    Mr. Cora Jara tolerated the procedure well with no complications      [id number] ICD-9-CM    1. Primary osteoarthritis of left knee M17.12 715.16 [place] DRAIN/INJECT LARGE JOINT/BURSA      EUFLEXXA INJECTION PER DOSE      sodium hyaluronate (SUPARTZ FX/HYALGAN/GENIVSC) 10 mg/mL syrg injection   2. Chronic pain of left knee M25.562 719.46 [place] DRAIN/INJECT LARGE JOINT/BURSA    G89.29 338.29 EUFLEXXA INJECTION PER DOSE      sodium hyaluronate (SUPARTZ FX/HYALGAN/GENIVSC) 10 mg/mL syrg injection     PLAN:  After discussing treatment options, patient's left knee was injected with 2 cc of Euflexxa. Mr. Cora Jara will follow up PRN now that he has completed his visco supplementation injection series.       Scribed by Maegan Menendez ([address]) as dictated by Jarrett Reyna MD

## 2020-10-15 ENCOUNTER — OFFICE VISIT (OUTPATIENT)
Dept: ORTHOPEDIC SURGERY | Age: 60
End: 2020-10-15
Payer: COMMERCIAL

## 2020-10-15 VITALS
SYSTOLIC BLOOD PRESSURE: 126 MMHG | OXYGEN SATURATION: 99 % | RESPIRATION RATE: 14 BRPM | TEMPERATURE: 96.8 F | HEART RATE: 82 BPM | BODY MASS INDEX: 25.25 KG/M2 | HEIGHT: 72 IN | DIASTOLIC BLOOD PRESSURE: 81 MMHG | WEIGHT: 186.4 LBS

## 2020-10-15 DIAGNOSIS — G89.29 CHRONIC PAIN OF LEFT KNEE: ICD-10-CM

## 2020-10-15 DIAGNOSIS — M17.12 PRIMARY OSTEOARTHRITIS OF LEFT KNEE: Primary | ICD-10-CM

## 2020-10-15 DIAGNOSIS — M25.562 CHRONIC PAIN OF LEFT KNEE: ICD-10-CM

## 2020-10-15 PROCEDURE — 20610 DRAIN/INJ JOINT/BURSA W/O US: CPT | Performed by: SPECIALIST

## 2020-10-15 PROCEDURE — 99213 OFFICE O/P EST LOW 20 MIN: CPT | Performed by: SPECIALIST

## 2020-10-15 RX ORDER — BETAMETHASONE SODIUM PHOSPHATE AND BETAMETHASONE ACETATE 3; 3 MG/ML; MG/ML
6 INJECTION, SUSPENSION INTRA-ARTICULAR; INTRALESIONAL; INTRAMUSCULAR; SOFT TISSUE ONCE
Qty: 0.5 ML | Refills: 0
Start: 2020-10-15 | End: 2020-10-15

## 2020-10-15 NOTE — PROGRESS NOTES
Patient: Leeanna Shafer                MRN: [id number]       SSN: [id number]  YOB: 1960        AGE: 61 y.o. SEX: male    PCP: Bharat Jenkins MD  10/15/20    Chief Complaint   Patient presents with    Knee Pain     Left      HISTORY:  Leeanna Shafer is a 61 y.o. male who is seen for left knee pain. He does not recall any recent knee injury. He completed a successful Euflexxa series in June 2018 that provided over a year of relief. He completed another successful Euflexxa series on 8/26/19. He experiences left knee stiffness. He sometimes feels a twinge in his left knee with certain movements. He notes relief with OTC NSAIDs and pain relievers. He previously felt increased pain when kickboxing- especially when twisting on his left leg during support stance. He states the majority of the kicks involved planting his left leg and kicking with his right. He had to stop hiss kickboxing classes recently due to the Coronavirus pandemic. He has tried a variety of conservative measures including cortisone injections, NSAIDs, and physical therapy. Pain Assessment  10/15/2020   Location of Pain Knee   Location Modifiers Left   Severity of Pain 0   Quality of Pain -   Quality of Pain Comment -   Duration of Pain -   Duration of Pain Comment -   Frequency of Pain -   Frequency of Pain Comment -   Aggravating Factors -   Aggravating Factors Comment -   Limiting Behavior -   Relieving Factors -   Relieving Factors Comment -   Result of Injury -     Occupation, etc:  Mr. Marceol Lopez retired as an  at the [employer]. He retired about 1 year ago. He is originally from [place] but moved up here after graduating from college 35 years ago. He lives in [place] with his wife. He has two adult sons and 7 grandchildren. He has one son in the [place] and one in school at [employer]--one more year until graduation. He hasn't been to the gym recently due to the Coronavirus shutdown.  He turned his garage into a gym during the pandemic. He started walking and jogging recently. Current weight is 186 pounds. He is 6' tall. He is not diabetic or hypertensive. No results found for: HBA1C, HGBE8, MBT9AVTH, KCT5QBSK, VYW6SUXC  Weight Metrics 10/15/2020 6/18/2020 4/6/2020 12/23/2019 11/25/2019 10/23/2019 8/26/2019   Weight 186 lb 6.4 oz 175 lb 175 lb 182 lb 184 lb 184 lb 181 lb 9.6 oz   BMI 25.28 kg/m2 23.73 kg/m2 23.73 kg/m2 24.01 kg/m2 24.28 kg/m2 24.28 kg/m2 24.63 kg/m2       Patient Active Problem List   Diagnosis Code    Colon polyp K63.5    High cholesterol E78.00     REVIEW OF SYSTEMS: All Below are Negative except: See HPI   Constitutional: negative for fever, chills, and weight loss. Cardiovascular: negative for chest pain, claudication, leg swelling, SOB, ROLDAN   Gastrointestinal: Negative for pain, N/V/C/D, Blood in stool or urine, dysuria,  hematuria, incontinence, pelvic pain. Musculoskeletal: See HPI   Neurological: Negative for dizziness and weakness. Negative for headaches, Visual changes, confusion, seizures   Phychiatric/Behavioral: Negative for depression, memory loss, substance  abuse. Extremities: Negative for hair changes, rash, or skin lesion changes. Hematologic: Negative for bleeding problems, bruising, pallor or swollen lymph  nodes   Peripheral Vascular: No calf pain, no circulation deficits.     Social History     Socioeconomic History    Marital status:      Spouse name: Not on file    Number of children: Not on file    Years of education: Not on file    Highest education level: Not on file   Occupational History    Not on file   Social Needs    Financial resource strain: Not on file    Food insecurity     Worry: Not on file     Inability: Not on file    Transportation needs     Medical: Not on file     Non-medical: Not on file   Tobacco Use    Smoking status: Never Smoker    Smokeless tobacco: Never Used   Substance and Sexual Activity    Alcohol use: No     Frequency: Never    Drug use: No    Sexual activity: Yes     Partners: Female   Lifestyle    Physical activity     Days per week: Not on file     Minutes per session: Not on file    Stress: Not on file   Relationships    Social connections     Talks on phone: Not on file     Gets together: Not on file     Attends Confucianism service: Not on file     Active member of club or organization: Not on file     Attends meetings of clubs or organizations: Not on file     Relationship status: Not on file    Intimate partner violence     Fear of current or ex partner: Not on file     Emotionally abused: Not on file     Physically abused: Not on file     Forced sexual activity: Not on file   Other Topics Concern    Not on file   Social History Narrative    ** Merged History Encounter **           No Known Allergies   Current Outpatient Medications   Medication Sig    levoFLOXacin (Levaquin) 750 mg tablet Take 1 tablet PO 1-2 hours prior to procedure    multivitamin (ONE A DAY) tablet Take 1 Tab by mouth daily.  naproxen sodium (ALEVE) 220 mg cap Take  by mouth.  simvastatin (ZOCOR) 20 mg tablet Take  by mouth nightly. No current facility-administered medications for this visit.        PHYSICAL EXAMINATION:  Visit Vitals  /81 (BP 1 Location: Left arm)   Pulse 82   Temp 96.8 °F (36 °C) (Temporal)   Resp 14   Ht 6' (1.829 m)   Wt 186 lb 6.4 oz (84.6 kg)   SpO2 99%   BMI 25.28 kg/m²      ORTHO EXAMINATION:  Examination Right knee Left knee   Skin Intact Intact   Range of motion 120-0 115-0   Effusion - +   Medial joint line tenderness - +   Lateral joint line tenderness - -   Popliteal tenderness - -   Osteophytes palpable - +, medial   Dannys - -   Patella crepitus - +   Anterior drawer - -   Lateral laxity - -   Medial laxity - -   Varus deformity - +   Valgus deformity - -   Pretibial edema - -   Calf tenderness - -     TIME OUT:  Chart reviewed for the following:   Lynda THAYER Jeanine Bowman MD, have reviewed the History, Physical and updated the Allergic reactions for [employer]   TIME OUT performed immediately prior to start of procedure:  Leona Momin MD, have performed the following reviews on [employer] prior to the start of the procedure:          * Patient was identified by name and date of birth   * Agreement on procedure being performed was verified  * Risks and Benefits explained to the patient  * Procedure site verified and marked as necessary  * Patient was positioned for comfort  * Consent was obtained     Time: 8:34 AM     Date of procedure: 10/15/2020  Procedure performed by:  Vanessa Rodriguez MD  Mr. Lolly Pham tolerated the procedure well with no complications. RADIOGRAPHS:  XR LEFT KNEE 3/13/18  IMPRESSION:  Three views - No fractures, no effusion, moderate medial joint space narrowing, + osteophytes present. Sclerosis and small defect of the medial femoral condyles. IMPRESSION:      ICD-10-CM ICD-9-CM    1. Primary osteoarthritis of left knee  M17.12 715.16 PROCEDURE AUTHORIZATION TO       betamethasone (Celestone Soluspan) 6 mg/mL injection      BETAMETHASONE ACETATE & SODIUM PHOSPHATE INJECTION 3 MG EA.      DRAIN/INJECT LARGE JOINT/BURSA   2. Chronic pain of left knee  M25.562 719.46 PROCEDURE AUTHORIZATION TO     G89.29 338.29 betamethasone (Celestone Soluspan) 6 mg/mL injection      BETAMETHASONE ACETATE & SODIUM PHOSPHATE INJECTION 3 MG EA.      DRAIN/INJECT LARGE JOINT/BURSA     PLAN: Consider visco supplementation if pain continues. He has tried a variety of conservative measures including NSAIDs, injections, and activity restrictions all with incomplete temporary relief. After discussing treatment options, patient's left knee was reinjected with 4 cc Marcaine and 1/2 cc Celestone. There is no need for surgery at this time. He will follow up as needed.     Scribed by Ashanti Mccray ([address]) as dictated by Vanessa Rodriguez MD

## 2020-11-11 ENCOUNTER — DOCUMENTATION ONLY (OUTPATIENT)
Dept: ORTHOPEDIC SURGERY | Age: 60
End: 2020-11-11

## 2021-01-11 ENCOUNTER — OFFICE VISIT (OUTPATIENT)
Dept: ORTHOPEDIC SURGERY | Age: 61
End: 2021-01-11
Payer: COMMERCIAL

## 2021-01-11 VITALS
DIASTOLIC BLOOD PRESSURE: 85 MMHG | BODY MASS INDEX: 25.19 KG/M2 | OXYGEN SATURATION: 98 % | HEART RATE: 77 BPM | TEMPERATURE: 97 F | HEIGHT: 72 IN | SYSTOLIC BLOOD PRESSURE: 139 MMHG | RESPIRATION RATE: 16 BRPM | WEIGHT: 186 LBS

## 2021-01-11 DIAGNOSIS — G89.29 CHRONIC PAIN OF LEFT KNEE: ICD-10-CM

## 2021-01-11 DIAGNOSIS — M25.562 CHRONIC PAIN OF LEFT KNEE: ICD-10-CM

## 2021-01-11 DIAGNOSIS — M17.12 PRIMARY OSTEOARTHRITIS OF LEFT KNEE: Primary | ICD-10-CM

## 2021-01-11 PROCEDURE — 20610 DRAIN/INJ JOINT/BURSA W/O US: CPT | Performed by: SPECIALIST

## 2021-01-11 PROCEDURE — 99213 OFFICE O/P EST LOW 20 MIN: CPT | Performed by: SPECIALIST

## 2021-01-11 NOTE — PROGRESS NOTES
Patient: Jayna Carmichael                MRN: [id number]       SSN: [id number]  YOB: 1960        AGE: 61 y.o. SEX: male    PCP: Pablo Gorman MD  01/11/21    Chief Complaint   Patient presents with    Knee Pain     left knee pain      HISTORY:  Jayna Carmichael is a 61 y.o. male who is seen for left knee pain. He does not recall any recent knee injury. He completed successful Euflexxa series in June 2018 and august 2019. He experiences left knee stiffness. He sometimes feels a twinge in his left knee with certain movements. He notes relief with OTC NSAIDs and pain relievers. He previously felt increased pain when kickboxing- especially when twisting on his left leg during support stance. He states the majority of the kicks involved planting his left leg and kicking with his right. He had to stop kickboxing classes recently due to the Coronavirus pandemic. He has tried a variety of conservative measures including cortisone injections, NSAIDs and physical therapy. Pain Assessment  1/11/2021   Location of Pain Knee   Location Modifiers Left   Severity of Pain 0   Quality of Pain -   Quality of Pain Comment -   Duration of Pain -   Duration of Pain Comment -   Frequency of Pain -   Frequency of Pain Comment -   Aggravating Factors -   Aggravating Factors Comment -   Limiting Behavior No   Relieving Factors (No Data)   Relieving Factors Comment gel injections   Result of Injury No     Occupation, etc:  Mr. Billie Olea retired as an  at the [employer]. He retired in 2017. He is originally from [place] [place] [facility] [place] but moved up here after graduating from college 35 years ago. He lives in [place] with his wife. He has two adult sons and 7 grandchildren. He has one son in the [employer] and one in school at [employer]--one more year until graduation. He hasn't been to the gym recently due to the Coronavirus shutdown. He turned his garage into a gym during the pandemic.  He started walking and jogging recently. Current weight is 186 pounds. He is 6' tall. He is not diabetic or hypertensive. No results found for: HBA1C, HGBE8, TZC6JTKI, VTD7AYSX, IXZ5GVQP  Weight Metrics 1/11/2021 1/7/2021 10/15/2020 6/18/2020 4/6/2020 12/23/2019 11/25/2019   Weight 186 lb 188 lb 186 lb 6.4 oz 175 lb 175 lb 182 lb 184 lb   BMI 25.23 kg/m2 25.5 kg/m2 25.28 kg/m2 23.73 kg/m2 23.73 kg/m2 24.01 kg/m2 24.28 kg/m2       Patient Active Problem List   Diagnosis Code    Colon polyp K63.5    High cholesterol E78.00     REVIEW OF SYSTEMS: All Below are Negative except: See HPI   Constitutional: negative for fever, chills, and weight loss. Cardiovascular: negative for chest pain, claudication, leg swelling, SOB, ROLDAN   Gastrointestinal: Negative for pain, N/V/C/D, Blood in stool or urine, dysuria,  hematuria, incontinence, pelvic pain. Musculoskeletal: See HPI   Neurological: Negative for dizziness and weakness. Negative for headaches, Visual changes, confusion, seizures   Phychiatric/Behavioral: Negative for depression, memory loss, substance  abuse. Extremities: Negative for hair changes, rash, or skin lesion changes. Hematologic: Negative for bleeding problems, bruising, pallor or swollen lymph  nodes   Peripheral Vascular: No calf pain, no circulation deficits.     Social History     Socioeconomic History    Marital status:      Spouse name: Not on file    Number of children: Not on file    Years of education: Not on file    Highest education level: Not on file   Occupational History    Not on file   Social Needs    Financial resource strain: Not on file    Food insecurity     Worry: Not on file     Inability: Not on file    Transportation needs     Medical: Not on file     Non-medical: Not on file   Tobacco Use    Smoking status: Never Smoker    Smokeless tobacco: Never Used   Substance and Sexual Activity    Alcohol use: No     Frequency: Never    Drug use: No    Sexual activity: Yes     Partners: Female   Lifestyle    Physical activity     Days per week: Not on file     Minutes per session: Not on file    Stress: Not on file   Relationships    Social connections     Talks on phone: Not on file     Gets together: Not on file     Attends Oriental orthodox service: Not on file     Active member of club or organization: Not on file     Attends meetings of clubs or organizations: Not on file     Relationship status: Not on file    Intimate partner violence     Fear of current or ex partner: Not on file     Emotionally abused: Not on file     Physically abused: Not on file     Forced sexual activity: Not on file   Other Topics Concern    Not on file   Social History Narrative    ** Merged History Encounter **           No Known Allergies   Current Outpatient Medications   Medication Sig    levoFLOXacin (Levaquin) 750 mg tablet Take 1 tablet PO 1-2 hours prior to procedure    multivitamin (ONE A DAY) tablet Take 1 Tab by mouth daily.  naproxen sodium (ALEVE) 220 mg cap Take  by mouth.  simvastatin (ZOCOR) 20 mg tablet Take  by mouth nightly. No current facility-administered medications for this visit.        PHYSICAL EXAMINATION:  Visit Vitals  /85 (BP 1 Location: Left arm, BP Patient Position: Sitting)   Pulse 77   Temp 97 °F (36.1 °C) (Temporal)   Resp 16   Ht 6' (1.829 m)   Wt 186 lb (84.4 kg)   SpO2 98%   BMI 25.23 kg/m²      ORTHO EXAMINATION:  Examination Right knee Left knee   Skin Intact Intact   Range of motion 120-0 115-0   Effusion - +   Medial joint line tenderness - +   Lateral joint line tenderness - -   Popliteal tenderness - -   Osteophytes palpable - +, medial   Dannys - -   Patella crepitus - +   Anterior drawer - -   Lateral laxity - -   Medial laxity - -   Varus deformity - +   Valgus deformity - -   Pretibial edema - -   Calf tenderness - -     TIME OUT:  Chart reviewed for the following:   IGreyson MD, have reviewed the History, Physical and updated the Allergic reactions for Ben & Ben   TIME OUT performed immediately prior to start of procedure:  Araseli Betancur MD, have performed the following reviews on Ben & Ben prior to the start of the procedure:          * Patient was identified by name and date of birth   * Agreement on procedure being performed was verified  * Risks and Benefits explained to the patient  * Procedure site verified and marked as necessary  * Patient was positioned for comfort  * Consent was obtained     Time: 10:27 AM     Date of procedure: 1/11/2021  Procedure performed by:  Kobe Mcneill MD  Mr. Melanie Stack tolerated the procedure well with no complications. RADIOGRAPHS:  XR LEFT KNEE 3/13/18  IMPRESSION:  Three views - No fractures, no effusion, moderate medial joint space narrowing, + osteophytes present. Sclerosis and small defect of the medial femoral condyles. IMPRESSION:      ICD-10-CM ICD-9-CM    1. Primary osteoarthritis of left knee  M17.12 715.16 sodium hyaluronate (SUPARTZ FX/EUFLEXXA/HYALGAN) 10 mg/mL injection syrg 20 mg      DRAIN/INJECT LARGE JOINT/BURSA   2. Chronic pain of left knee  M25.562 719.46 sodium hyaluronate (SUPARTZ FX/EUFLEXXA/HYALGAN) 10 mg/mL injection syrg 20 mg    G89.29 338.29 DRAIN/INJECT LARGE JOINT/BURSA     PLAN:    After discussing treatment options, patient's left knee was reinjected with 2 cc Euflexxa. There is no need for surgery at this time. He will follow up in 1 week for Euflexxa #2.      Scribed by Chin Carlson ([address]) as dictated by Kobe Mcneill MD

## 2021-01-18 ENCOUNTER — OFFICE VISIT (OUTPATIENT)
Dept: ORTHOPEDIC SURGERY | Age: 61
End: 2021-01-18
Payer: COMMERCIAL

## 2021-01-18 VITALS
BODY MASS INDEX: 24.79 KG/M2 | DIASTOLIC BLOOD PRESSURE: 79 MMHG | RESPIRATION RATE: 16 BRPM | TEMPERATURE: 97.8 F | HEART RATE: 85 BPM | SYSTOLIC BLOOD PRESSURE: 139 MMHG | WEIGHT: 183 LBS | HEIGHT: 72 IN | OXYGEN SATURATION: 98 %

## 2021-01-18 DIAGNOSIS — M25.562 CHRONIC PAIN OF LEFT KNEE: ICD-10-CM

## 2021-01-18 DIAGNOSIS — M17.12 PRIMARY OSTEOARTHRITIS OF LEFT KNEE: Primary | ICD-10-CM

## 2021-01-18 DIAGNOSIS — G89.29 CHRONIC PAIN OF LEFT KNEE: ICD-10-CM

## 2021-01-18 PROCEDURE — 20610 DRAIN/INJ JOINT/BURSA W/O US: CPT | Performed by: SPECIALIST

## 2021-01-18 NOTE — PROGRESS NOTES
Patient: Dana Esparza                MRN: [id number]       SSN: [id number]  YOB: 1960        AGE: 61 y.o. SEX: male  Body mass index is 24.82 kg/m². PCP: Leonard Villalpando MD  01/18/21    Chief Complaint   Patient presents with    Knee Pain     left knee pain     HISTORY:  Dana Esparza is a 61 y.o. male who is seen for left knee pain. ICD-10-CM ICD-9-CM    1. Primary osteoarthritis of left knee  M17.12 715.16 sodium hyaluronate (SUPARTZ FX/EUFLEXXA/HYALGAN) 10 mg/mL injection syrg 20 mg      DRAIN/INJECT LARGE JOINT/BURSA   2. Chronic pain of left knee  M25.562 719.46     G89.29 338.29      Chart reviewed for the following:   Inna Mccray MD, have reviewed the History, Physical and updated the Allergic reactions for David Goemz     TIME OUT performed immediately prior to start of procedure:  Inna Mccray MD, have performed the following reviews on Dana Esparza prior to the start of the procedure:            * Patient was identified by name and date of birth   * Agreement on procedure being performed was verified  * Risks and Benefits explained to the patient  * Procedure site verified and marked as necessary  * Patient was positioned for comfort  * Consent was obtained     Time: 10:38 AM     Date of procedure: 1/18/2021    Procedure performed by:  Pam Hernández MD    Mr. Caty Estrada tolerated the procedure well with no complications. PLAN:  After discussing treatment options, patient's left knee was injected with 2 cc of Euflexxa. Mr. Caty Estrada will follow up in one week to complete his visco supplementation injection series.       Scribed by [employer] ([address]) as dictated by Pam Hernández MD

## 2021-01-25 ENCOUNTER — OFFICE VISIT (OUTPATIENT)
Dept: ORTHOPEDIC SURGERY | Age: 61
End: 2021-01-25
Payer: COMMERCIAL

## 2021-01-25 VITALS
HEIGHT: 72 IN | BODY MASS INDEX: 24.92 KG/M2 | DIASTOLIC BLOOD PRESSURE: 81 MMHG | TEMPERATURE: 97.7 F | RESPIRATION RATE: 16 BRPM | SYSTOLIC BLOOD PRESSURE: 136 MMHG | HEART RATE: 94 BPM | WEIGHT: 184 LBS

## 2021-01-25 DIAGNOSIS — M25.562 CHRONIC PAIN OF LEFT KNEE: ICD-10-CM

## 2021-01-25 DIAGNOSIS — M17.12 PRIMARY OSTEOARTHRITIS OF LEFT KNEE: Primary | ICD-10-CM

## 2021-01-25 DIAGNOSIS — G89.29 CHRONIC PAIN OF LEFT KNEE: ICD-10-CM

## 2021-01-25 PROCEDURE — 20610 DRAIN/INJ JOINT/BURSA W/O US: CPT | Performed by: SPECIALIST

## 2021-11-09 ENCOUNTER — TRANSCRIBE ORDER (OUTPATIENT)
Dept: SCHEDULING | Age: 61
End: 2021-11-09

## 2021-11-09 DIAGNOSIS — R79.89 HYPOURICEMIA: Primary | ICD-10-CM

## 2021-11-23 ENCOUNTER — HOSPITAL ENCOUNTER (OUTPATIENT)
Dept: CT IMAGING | Age: 61
Discharge: HOME | End: 2021-11-23
Attending: INTERNAL MEDICINE
Payer: COMMERCIAL

## 2021-11-23 DIAGNOSIS — R79.89 HYPOURICEMIA: ICD-10-CM

## 2021-11-23 PROCEDURE — 74176 CT ABD & PELVIS W/O CONTRAST: CPT

## 2023-02-23 ENCOUNTER — OFFICE VISIT (OUTPATIENT)
Age: 63
End: 2023-02-23

## 2023-02-23 VITALS — TEMPERATURE: 97.3 F | HEIGHT: 72 IN | WEIGHT: 182 LBS | BODY MASS INDEX: 24.65 KG/M2

## 2023-02-23 DIAGNOSIS — M17.12 PRIMARY OSTEOARTHRITIS OF LEFT KNEE: Primary | ICD-10-CM

## 2023-02-23 NOTE — PROGRESS NOTES
Patient: Desire Cabrera                 MRN: [id number]       SSN:  [id number]   YOB: 1960         AGE: 61 y.o. SEX:  male     PCP: Jenny Pacheco MD  02/23/23             Chief Complaint       Patient presents with          Knee Pain             Left         HISTORY:  Desire Cabrera is a 61 y.o.  male who is seen for left knee pain. He responded to his injection last ov but his pains have returned. He does not recall any recent knee injury. He completed a successful Euflexxa series in June 2018 that  provided over a year of relief. He completed another successful Euflexxa series on 8/26/19. He experiences left knee stiffness. He sometimes feels a twinge in his left knee with certain movements. He notes relief with OTC NSAIDs and pain relievers. He previously felt increased pain when kickboxing- especially when twisting on his left leg during support stance. He states the majority of the kicks involved planting his left leg and kicking with his right. He had to stop hiss kickboxing classes  recently due to the Coronavirus pandemic. He has tried a variety of conservative measures including cortisone injections, NSAIDs, and physical  therapy. Pain Assessment   10/15/2020        Location of Pain  Knee     Location Modifiers  Left     Severity of Pain  0     Quality of Pain  -     Quality of Pain Comment  -     Duration of Pain  -     Duration of Pain Comment  -     Frequency of Pain  -     Frequency of Pain Comment  -     Aggravating Factors  -     Aggravating Factors Comment  -     Limiting Behavior  -     Relieving Factors  -     Relieving Factors Comment  -        Result of Injury  -        Occupation, etc:  Mr. Shereen Verdugo retired as an  at the [employer]. He retired about 1 year ago. He is originally from [place] [place] but moved up here after graduating from college 35 years ago. He lives in [place]  with his wife.  He has two adult sons and 7 grandchildren. He has one son in the [employer] and one in school at [employer]--one more year until graduation. He turned his garage into a gym during the pandemic. He started walking and jogging recently. Current weight is 186 pounds. He is 6' tall. He is not diabetic or hypertensive. No results found for: HBA1C, HGBE8, GRP5XMBG, OKH3ZJNE, YCW0DWPG            Weight Metrics  10/15/2020  6/18/2020  4/6/2020  12/23/2019  11/25/2019  10/23/2019  8/26/2019              Weight  186 lb 6.4 oz  175 lb  175 lb  182 lb  184 lb  184 lb  181 lb 9.6 oz              BMI  25.28 kg/m2  23.73 kg/m2  23.73 kg/m2  24.01 kg/m2  24.28 kg/m2  24.28 kg/m2  24.63 kg/m2                  Patient Active Problem List        Diagnosis  Code           Colon polyp  K63.5           High cholesterol  E78.00        REVIEW OF SYSTEMS: All Below are Negative  except: See HPI              Constitutional: negative for fever, chills, and weight loss. Cardiovascular: negative for chest pain, claudication, leg swelling, SOB, FLORES              Gastrointestinal: Negative for pain, N/V/C/D, Blood in stool or urine, dysuria,       hematuria, incontinence, pelvic pain. Musculoskeletal: See HPI              Neurological: Negative for dizziness and weakness. Negative for headaches, Visual changes, confusion, seizures              Phychiatric/Behavioral: Negative for depression, memory loss, substance          abuse. Extremities: Negative for hair changes, rash, or skin lesion changes. Hematologic: Negative for bleeding problems, bruising, pallor or swollen lymph   nodes              Peripheral Vascular: No calf pain, no circulation deficits.         Social History                Socioeconomic History           Marital status:                Spouse name:  Not on file           Number of children:  Not on file       Years of education:  Not on file       Highest education level: Not on file       Occupational History          Not on file       Social Needs           Financial resource strain:  Not on file          Food insecurity              Worry:  Not on file         Inability:  Not on file          Transportation needs              Medical:  Not on file         Non-medical:  Not on file       Tobacco Use           Smoking status:  Never Smoker       Smokeless tobacco:  Never Used       Substance and Sexual Activity           Alcohol use: No              Frequency:  Never           Drug use:  No       Sexual activity:  Yes              Partners:  Female       Lifestyle          Physical activity              Days per week:  Not on file         Minutes per session:  Not on file           Stress:  Not on file       Relationships          Social connections              Talks on phone:  Not on file         Gets together:  Not on file         Attends Taoism service:  Not on file         Active member of club or organization:  Not on file         Attends meetings of clubs or organizations:  Not on file         Relationship status:  Not on file          Intimate partner violence              Fear of current or ex partner:  Not on file         Emotionally abused:  Not on file         Physically abused:  Not on file         Forced sexual activity:  Not on file        Other Topics  Concern          Not on file       Social History Narrative          ** Merged History Encounter **                    No Known Allergies           Current Outpatient Medications        Medication  Sig           levoFLOXacin (Levaquin) 750 mg tablet  Take 1 tablet PO 1-2 hours prior to procedure       multivitamin (ONE A DAY) tablet  Take 1 Tab by mouth daily. naproxen sodium (ALEVE) 220 mg cap  Take  by mouth. simvastatin (ZOCOR) 20 mg tablet  Take  by mouth nightly. No current facility-administered medications for this visit.           PHYSICAL EXAMINATION:   Visit Vitals      BP  126/81 (BP 1 Location: Left arm)     Pulse  82     Temp  96.8 °F (36 °C) (Temporal)     Resp  14     Ht  6' (1.829 m)     Wt  186 lb 6.4 oz (84.6 kg)     SpO2  99%        BMI  25.28 kg/m²         ORTHO EXAMINATION:       Examination  Right knee  Left knee         Skin  Intact  Intact         Range of motion  120-0  115-0     Effusion  -  +     Medial joint line tenderness  -  +     Lateral joint line tenderness  -  -     Popliteal tenderness  -  -     Osteophytes palpable  -  +, medial     Jesús's  -  -     Patella crepitus  -  +     Anterior drawer  -  -     Lateral laxity  -  -     Medial laxity  -  -     Varus deformity  -  +     Valgus deformity  -  -     Pretibial edema  -  -         Calf tenderness  -  -              IMPRESSION:    Encounter Diagnosis   Name Primary? Primary osteoarthritis of left knee Yes       PLAN:  I will see him back in 1 -2 months. Consider viscosupplementation if pain continues. There is no need for surgery at this time. He will follow up as PRN.

## 2023-05-18 NOTE — PROGRESS NOTES
Patient: Meghan Woodruff                MRN: [id number]       SSN: [id number]  YOB: 1960        AGE: 61 y.o. SEX: male      PCP: Tania Rutherford MD  05/19/23    Chief Complaint   Patient presents with    Knee Pain     Left knee pain       HISTORY:  Meghan Woodruff is a 61 y.o. male who is seen for left knee pain. He presents today for his first injection in the Euflexxa injection series. He was previously seen for left knee pain. He responded to his injection last ov but his pains have returned. He does not recall any recent knee injury. He completed a successful Euflexxa series in June 2018 that  provided over a year of relief. He completed another successful Euflexxa series on 8/26/19. He experiences left knee stiffness. He sometimes feels a twinge in his left knee with certain movements. He notes relief with OTC NSAIDs and pain relievers. He previously felt increased pain when kickboxing- especially when twisting on his left leg during support stance. He states the majority of the kicks involved planting his left leg and kicking with his right. He had to stop hiss kickboxing classes  recently due to the Coronavirus pandemic. He has tried a variety of conservative measures including cortisone injections, NSAIDs, and physical  therapy. Occupation, etc: Mr. Ulises Hudson retired as an  at the [employer]. He retired about 1 year ago. He is originally from [facility] [place] but moved up here after graduating from college 35 years ago. He lives in [place]  with his wife. He has two adult sons and 7 grandchildren. He has one son in the [employer] and one in school at [employer]--one more year until graduation. He turned his garage into a gym during the pandemic. He started walking and jogging recently. Current weight is 186 pounds. He is 6' tall. He is not diabetic or hypertensive.      Wt Readings from Last 3 Encounters:   05/19/23 185 lb (83.9 kg)

## 2023-05-19 ENCOUNTER — OFFICE VISIT (OUTPATIENT)
Age: 63
End: 2023-05-19

## 2023-05-19 VITALS — HEIGHT: 72 IN | WEIGHT: 185 LBS | BODY MASS INDEX: 25.06 KG/M2 | TEMPERATURE: 97.7 F

## 2023-05-19 DIAGNOSIS — M17.12 PRIMARY OSTEOARTHRITIS OF LEFT KNEE: Primary | ICD-10-CM

## 2023-05-19 RX ORDER — HYALURONATE SODIUM 10 MG/ML
20 SYRINGE (ML) INTRAARTICULAR ONCE
Status: COMPLETED | OUTPATIENT
Start: 2023-05-19 | End: 2023-05-19

## 2023-05-19 RX ADMIN — Medication 20 MG: at 09:17

## 2023-05-26 ENCOUNTER — OFFICE VISIT (OUTPATIENT)
Age: 63
End: 2023-05-26

## 2023-05-26 VITALS — BODY MASS INDEX: 25.06 KG/M2 | WEIGHT: 185 LBS | TEMPERATURE: 97.5 F | HEIGHT: 72 IN

## 2023-05-26 DIAGNOSIS — M17.12 PRIMARY OSTEOARTHRITIS OF LEFT KNEE: Primary | ICD-10-CM

## 2023-05-26 RX ORDER — HYALURONATE SODIUM 10 MG/ML
20 SYRINGE (ML) INTRAARTICULAR ONCE
Status: COMPLETED | OUTPATIENT
Start: 2023-05-26 | End: 2023-05-26

## 2023-05-26 RX ADMIN — Medication 20 MG: at 09:21

## 2023-06-01 NOTE — PROGRESS NOTES
Patient: Saurabh Thompson                MRN: [id number]       SSN: [id number]  YOB: 1960        AGE: 61 y.o. SEX: male  Body mass index is 25.5 kg/m². PCP: Rommel Palacio MD  06/02/23    Chief Complaint   Patient presents with    Knee Pain     Left knee Euflexxa injections     HISTORY:  Saurabh Thompson is a 61 y.o. male who is seen for left knee pain. He presents today for his third injection in the Euflexxa injection series. PROCEDURE:  Mr. Tho Love left knee was injected with 2 cc of Euflexxa. TIME OUT performed immediately prior to start of procedure:  Romaine Lugo MD, have performed the following reviews on Saurabh Thompson prior to the start of the procedure:            * Patient was identified by name and date of birth   * Agreement on procedure being performed was verified  * Risks and Benefits explained to the patient  * Procedure site verified and marked as necessary  * Patient was positioned for comfort  * Consent was obtained     Time: 9:15 AM     Date of procedure: 6/2/2023    Procedure performed by:  Efra Parry MD    Mr. Maria Guadalupe Geronimo tolerated the procedure well with no complications      [id number]    1. Primary osteoarthritis of left knee  M17.12 DRAIN/INJECT LARGE JOINT/BURSA     sodium hyaluronate (EUFLEXXA, HYALGAN) injection 20 mg        PLAN:  Mr. Maria Guadalupe Geronimo will follow up PRN now that he has completed his visco supplementation injection series.

## 2023-06-02 ENCOUNTER — OFFICE VISIT (OUTPATIENT)
Age: 63
End: 2023-06-02

## 2023-06-02 VITALS — HEIGHT: 72 IN | WEIGHT: 188 LBS | BODY MASS INDEX: 25.47 KG/M2 | TEMPERATURE: 98 F

## 2023-06-02 DIAGNOSIS — M17.12 PRIMARY OSTEOARTHRITIS OF LEFT KNEE: Primary | ICD-10-CM

## 2023-06-02 RX ORDER — HYALURONATE SODIUM 10 MG/ML
20 SYRINGE (ML) INTRAARTICULAR ONCE
Status: COMPLETED | OUTPATIENT
Start: 2023-06-02 | End: 2023-06-02

## 2023-06-02 RX ADMIN — Medication 20 MG: at 09:11

## 2024-03-29 ENCOUNTER — OFFICE VISIT (OUTPATIENT)
Age: 64
End: 2024-03-29
Payer: COMMERCIAL

## 2024-03-29 VITALS — WEIGHT: 184 LBS | BODY MASS INDEX: 24.92 KG/M2 | TEMPERATURE: 97.3 F | HEIGHT: 72 IN

## 2024-03-29 DIAGNOSIS — M17.11 PRIMARY OSTEOARTHRITIS OF RIGHT KNEE: ICD-10-CM

## 2024-03-29 DIAGNOSIS — G89.29 CHRONIC PAIN OF LEFT KNEE: ICD-10-CM

## 2024-03-29 DIAGNOSIS — G89.29 CHRONIC PAIN OF RIGHT KNEE: Primary | ICD-10-CM

## 2024-03-29 DIAGNOSIS — M17.12 PRIMARY OSTEOARTHRITIS OF LEFT KNEE: ICD-10-CM

## 2024-03-29 DIAGNOSIS — M25.561 CHRONIC PAIN OF RIGHT KNEE: Primary | ICD-10-CM

## 2024-03-29 DIAGNOSIS — M25.562 CHRONIC PAIN OF LEFT KNEE: ICD-10-CM

## 2024-03-29 PROCEDURE — 99213 OFFICE O/P EST LOW 20 MIN: CPT | Performed by: SPECIALIST

## 2024-03-29 PROCEDURE — 73562 X-RAY EXAM OF KNEE 3: CPT | Performed by: SPECIALIST

## 2024-03-29 NOTE — PROGRESS NOTES
Patient: Rusty Myers                MRN: [id number]       SSN: [id number]  YOB: 1960        AGE: 64 y.o.        SEX: male    PCP: Zeyad Lim MD  03/29/24    Chief Complaint   Patient presents with    Knee Pain     right       HISTORY:  Rusty Myers is a 64 y.o. male who is seen for increased right knee pain. He reports that he has been feeling an aching discomfort & left knee stiffness. He feels discomfort with standing, walking and stair climbing. Patient successfully completed a left knee Euflexxa series on 6/2/23.  He completed a successful Euflexxa series in June 2018 that  provided over a year of relief. He completed another successful Euflexxa series on 8/26/19. He sometimes feels a twinge in his left knee with certain movements. He notes relief with OTC NSAIDs and pain relievers.   He previously felt increased pain when kickboxing- especially when twisting on his left leg during support stance.      Occupation, etc: Mr. Myers retired as an  at the [employer]. He retired about 1 year ago. He is originally from [place] but moved up here after graduating from college 35 years ago. He lives in [place]  with his wife. He has two adult sons and 7 grandchildren. He has one son in the [employer] and one in school at [employer]--one more year until graduation.  He turned his garage into a gym during the pandemic. He started walking and jogging recently. Current weight is 186 pounds. He is 6' tall.  He is not diabetic or hypertensive.   Wt Readings from Last 3 Encounters:   03/29/24 83.5 kg (184 lb)   01/09/24 85.7 kg (189 lb)   09/21/23 81.6 kg (180 lb)      Body mass index is 24.95 kg/m².    Patient Active Problem List   Diagnosis    High cholesterol    Colon polyp       Social History     Tobacco Use    Smoking status: Never    Smokeless tobacco: Never   Substance Use Topics    Alcohol use: No    Drug use: No        No Known Allergies     Current

## 2024-04-24 NOTE — PROGRESS NOTES
Use    Smoking status: Never    Smokeless tobacco: Never   Substance Use Topics    Alcohol use: No    Drug use: No        No Known Allergies     Current Outpatient Medications   Medication Sig    cycloSPORINE (RESTASIS) 0.05 % ophthalmic emulsion     tadalafil (CIALIS) 5 MG tablet Take 1 tablet by mouth daily    sildenafil (VIAGRA) 100 MG tablet     Testosterone (ANDROGEL) 20.25 MG/ACT (1.62%) GEL gel Apply 1 pump to each upper arm daily    atorvastatin (LIPITOR) 20 MG tablet Take 1 tablet by mouth daily    simvastatin (ZOCOR) 20 MG tablet Take by mouth     Current Facility-Administered Medications   Medication Dose Route Frequency    sodium hyaluronate (EUFLEXXA, HYALGAN) injection 20 mg  20 mg Intra-artICUlar Once        PHYSICAL EXAMINATION:  Temp 97.3 °F (36.3 °C) (Temporal)   Ht 1.829 m (6')   Wt 85.7 kg (189 lb)   BMI 25.63 kg/m²      ORTHO EXAMINATION:  Examination  Right knee  Left knee         Skin  Intact  Intact         Range of motion  120-0  115-0     Effusion  +++  +     Medial joint line tenderness  -  +     Lateral joint line tenderness  -  -     Popliteal tenderness  -  -     Osteophytes palpable  +  +, medial     Jesús's  -  -     Patella crepitus  -  +     Anterior drawer  -  -     Lateral laxity  -  -     Medial laxity  -  -     Varus deformity  -  +     Valgus deformity  -  -     Pretibial edema  -  -         Calf tenderness  -  -      RADIOGRAPHS:   03/29/24  3 VIEWS KNEE SOPHIA  Three views of right knee: no fractures, no effusion, Kellgren Dez grade 2 with moderate joint space narrowing (L>R), no osteophytes present.     XR LEFT KNEE 3/13/18  IMPRESSION: Three views - No fractures, no effusion, moderate medial joint space narrowing, + osteophytes present. Sclerosis and small defect of the medial femoral condyles.     PROCEDURE: Patient, after timeout and under sterile conditions, right knee was aspirated 35 cc  clear yellow . The fluid was discarded. Patient's right knee injected

## 2024-04-26 ENCOUNTER — OFFICE VISIT (OUTPATIENT)
Age: 64
End: 2024-04-26

## 2024-04-26 VITALS — BODY MASS INDEX: 25.6 KG/M2 | HEIGHT: 72 IN | WEIGHT: 189 LBS | TEMPERATURE: 97.3 F

## 2024-04-26 DIAGNOSIS — M25.461 EFFUSION OF RIGHT KNEE: ICD-10-CM

## 2024-04-26 DIAGNOSIS — M17.11 UNILATERAL PRIMARY OSTEOARTHRITIS, RIGHT KNEE: Primary | ICD-10-CM

## 2024-04-26 RX ORDER — HYALURONATE SODIUM 10 MG/ML
20 SYRINGE (ML) INTRAARTICULAR ONCE
Status: COMPLETED | OUTPATIENT
Start: 2024-04-26 | End: 2024-04-26

## 2024-04-26 RX ADMIN — Medication 20 MG: at 10:56

## 2024-04-29 NOTE — PROGRESS NOTES
Patient: Rusty Myers                MRN: [id number]       SSN: [id number]  YOB: 1960        AGE: 64 y.o.        SEX: male  There is no height or weight on file to calculate BMI.    PCP: Zeyad Lim MD  05/03/24    Chief Complaint   Patient presents with    Knee Pain     Right knee     HISTORY:  Rusty Myers is a 64 y.o. male who is seen for right knee pain. He presents today for his second injection in the Euflexxa visco supplementation series.      ICD-10-CM    1. Unilateral primary osteoarthritis, right knee  M17.11 DRAIN/INJECT LARGE JOINT/BURSA     sodium hyaluronate (EUFLEXXA, HYALGAN) injection 20 mg         PROCEDURE:  Mr. Myers's right knee injected with 2 cc of Euflexxa.     Chart reviewed for the following:   Pravin DINERO MD, have reviewed the History, Physical and updated the Allergic reactions for Rusty Myers     TIME OUT performed immediately prior to start of procedure:  Pravin DINERO MD, have performed the following reviews on Rusty Myers prior to the start of the procedure:            * Patient was identified by name and date of birth   * Agreement on procedure being performed was verified  * Risks and Benefits explained to the patient  * Procedure site verified and marked as necessary  * Patient was positioned for comfort  * Consent was obtained     Time: 10:42 AM     Date of procedure: 5/3/2024    Procedure performed by:  Pravin Hatfield MD    Mr. Myers tolerated the procedure well with no complications.    PLAN: Mr. Myers's right knee injected with 2 cc of Euflexxa. Mr. Myers will follow up in one week to complete his visco supplementation injection series.    Documentation by kristofer Birmingham, as documented by Pravin Hatfield MD.

## 2024-05-03 ENCOUNTER — OFFICE VISIT (OUTPATIENT)
Age: 64
End: 2024-05-03

## 2024-05-03 DIAGNOSIS — M17.11 UNILATERAL PRIMARY OSTEOARTHRITIS, RIGHT KNEE: Primary | ICD-10-CM

## 2024-05-03 RX ORDER — HYALURONATE SODIUM 10 MG/ML
20 SYRINGE (ML) INTRAARTICULAR ONCE
Status: COMPLETED | OUTPATIENT
Start: 2024-05-03 | End: 2024-05-03

## 2024-05-03 RX ADMIN — Medication 20 MG: at 10:42

## 2024-05-08 NOTE — PROGRESS NOTES
Patient: Rusty Myers                MRN: [id number]       SSN: [id number]  YOB: 1960        AGE: 64 y.o.        SEX: male  Body mass index is 25.63 kg/m².    PCP: Zeyad Lim MD  05/13/24    Chief Complaint   Patient presents with    Knee Pain     RIGHT KNEE EUFLEXXA #3     HISTORY:  Rusty Myers is a 64 y.o. male who is seen for right knee pain. He presents today for his third injection in the Euflexxa visco supplementation series.    PROCEDURE:  Mr. Jimenez right knee injected with 2 cc of Euflexxa.     TIME OUT performed immediately prior to start of procedure:  I, Pravin Hatfield MD, have performed the following reviews on Rusty Myers prior to the start of the procedure:            * Patient was identified by name and date of birth   * Agreement on procedure being performed was verified  * Risks and Benefits explained to the patient  * Procedure site verified and marked as necessary  * Patient was positioned for comfort  * Consent was obtained     Time: 10:47 AM     Date of procedure: 5/13/2024    Procedure performed by:  Pravin Hatfield MD    Mr. Myers tolerated the procedure well with no complications      ICD-10-CM    1. Unilateral primary osteoarthritis, right knee  M17.11 DRAIN/INJECT LARGE JOINT/BURSA     sodium hyaluronate (EUFLEXXA, HYALGAN) injection 20 mg        PLAN: Mr. Jimenez right knee injected with 2 cc of Euflexxa. Mr. Myers will follow up PRN now that he has completed his visco supplementation injection series.     Documentation by kristofer Birmingham, as documented by Pravin Hatfield MD.

## 2024-05-13 ENCOUNTER — OFFICE VISIT (OUTPATIENT)
Age: 64
End: 2024-05-13
Payer: COMMERCIAL

## 2024-05-13 VITALS — WEIGHT: 189 LBS | BODY MASS INDEX: 25.6 KG/M2 | TEMPERATURE: 97 F | HEIGHT: 72 IN

## 2024-05-13 DIAGNOSIS — M17.11 UNILATERAL PRIMARY OSTEOARTHRITIS, RIGHT KNEE: Primary | ICD-10-CM

## 2024-05-13 PROCEDURE — 20610 DRAIN/INJ JOINT/BURSA W/O US: CPT | Performed by: SPECIALIST

## 2024-05-13 RX ORDER — HYALURONATE SODIUM 10 MG/ML
20 SYRINGE (ML) INTRAARTICULAR ONCE
Status: COMPLETED | OUTPATIENT
Start: 2024-05-13 | End: 2024-05-13

## 2024-05-13 RX ADMIN — Medication 20 MG: at 10:48

## 2025-01-27 ENCOUNTER — OFFICE VISIT (OUTPATIENT)
Age: 65
End: 2025-01-27
Payer: COMMERCIAL

## 2025-01-27 VITALS — WEIGHT: 178 LBS | HEIGHT: 72 IN | RESPIRATION RATE: 16 BRPM | BODY MASS INDEX: 24.11 KG/M2

## 2025-01-27 DIAGNOSIS — G89.29 CHRONIC PAIN OF LEFT KNEE: Primary | ICD-10-CM

## 2025-01-27 DIAGNOSIS — M17.11 UNILATERAL PRIMARY OSTEOARTHRITIS, RIGHT KNEE: ICD-10-CM

## 2025-01-27 DIAGNOSIS — M17.12 PRIMARY OSTEOARTHRITIS OF LEFT KNEE: ICD-10-CM

## 2025-01-27 DIAGNOSIS — M25.562 CHRONIC PAIN OF LEFT KNEE: Primary | ICD-10-CM

## 2025-01-27 PROCEDURE — 99213 OFFICE O/P EST LOW 20 MIN: CPT | Performed by: SPECIALIST

## 2025-01-27 PROCEDURE — 20610 DRAIN/INJ JOINT/BURSA W/O US: CPT | Performed by: SPECIALIST

## 2025-01-27 RX ORDER — BUPIVACAINE HYDROCHLORIDE 5 MG/ML
4 INJECTION, SOLUTION PERINEURAL ONCE
Status: COMPLETED | OUTPATIENT
Start: 2025-01-27 | End: 2025-01-27

## 2025-01-27 RX ORDER — BETAMETHASONE SODIUM PHOSPHATE AND BETAMETHASONE ACETATE 3; 3 MG/ML; MG/ML
3 INJECTION, SUSPENSION INTRA-ARTICULAR; INTRALESIONAL; INTRAMUSCULAR; SOFT TISSUE ONCE
Status: COMPLETED | OUTPATIENT
Start: 2025-01-27 | End: 2025-01-27

## 2025-01-27 RX ADMIN — BUPIVACAINE HYDROCHLORIDE 20 MG: 5 INJECTION, SOLUTION PERINEURAL at 13:37

## 2025-01-27 RX ADMIN — BETAMETHASONE SODIUM PHOSPHATE AND BETAMETHASONE ACETATE 3 MG: 3; 3 INJECTION, SUSPENSION INTRA-ARTICULAR; INTRALESIONAL; INTRAMUSCULAR; SOFT TISSUE at 13:37

## 2025-01-27 NOTE — PROGRESS NOTES
Patient: Rusty Myers                MRN: [id number]       SSN: [id number]  YOB: 1960        AGE: 64 y.o.        SEX: male      PCP: Zeyad Lim MD  01/27/25    Chief Complaint   Patient presents with    Follow-up     Left knee pain     HISTORY:  Rusty Myers is a 64 y.o. male who is seen for increased left knee pain.  Patient successfully completed a right knee Euflexxa series on 5/13/24 but his pain has returned.  He is now experiencing mostly left knee pain. He reports that he has been feeling an aching stiffness in his left knee. He feels discomfort with standing, walking and stair climbing. Patient successfully completed a left knee Euflexxa series on 6/2/23.      He completed a successful Euflexxa series in June 2018 that provided over a year of relief. He completed another successful Euflexxa series on 8/26/19. He sometimes feels a twinge in his left knee with certain movements. He notes relief with OTC NSAIDs and pain relievers.   He previously felt increased pain when kickboxing- especially when twisting on his left leg during support stance.      Occupation, etc: Mr. Myers retired as an  at the [employer]. He retired about 1 year ago. He plans to do more traveling including a 30 day trip around the [place] as part of his bucket list. He is originally from [place] but moved up here after graduating from college 35 years ago. He lives in [place] with his wife. He has two adult sons and 7 grandchildren. He has one son in the [employer] and another [employer] graduate living in [place].  He likes to walk and jog for exercise. Current weight is 178 pounds. He is 6' tall.  He is not diabetic or hypertensive.   Wt Readings from Last 3 Encounters:   01/27/25 80.7 kg (178 lb)   01/21/25 80.7 kg (178 lb)   07/24/24 80.7 kg (178 lb)      Body mass index is 24.14 kg/m².    Patient Active Problem List   Diagnosis   (none) - all problems resolved or

## 2025-03-10 ENCOUNTER — OFFICE VISIT (OUTPATIENT)
Age: 65
End: 2025-03-10

## 2025-03-10 DIAGNOSIS — M17.11 PRIMARY OSTEOARTHRITIS OF RIGHT KNEE: ICD-10-CM

## 2025-03-10 DIAGNOSIS — M17.12 PRIMARY OSTEOARTHRITIS OF LEFT KNEE: Primary | ICD-10-CM

## 2025-03-10 RX ORDER — HYALURONATE SODIUM 10 MG/ML
20 SYRINGE (ML) INTRAARTICULAR ONCE
Status: COMPLETED | OUTPATIENT
Start: 2025-03-10 | End: 2025-03-10

## 2025-03-10 RX ADMIN — Medication 20 MG: at 10:02

## 2025-03-10 NOTE — PROGRESS NOTES
Patient: Rusty yMers                MRN: [id number]       SSN: [id number]  YOB: 1960        AGE: 64 y.o.        SEX: male  There is no height or weight on file to calculate BMI.    PCP: Zeyad Lim MD  03/10/25    Chief Complaint   Patient presents with    Knee Pain     bilateral       HISTORY:  Rusty Myers is a 64 y.o. male who is seen for knee pain.    ICD-10-CM    1. Primary osteoarthritis of left knee  M17.12 DRAIN/INJECT LARGE JOINT/BURSA     sodium hyaluronate (EUFLEXXA, HYALGAN) injection 20 mg     sodium hyaluronate (EUFLEXXA, HYALGAN) injection 20 mg      2. Primary osteoarthritis of right knee  M17.11 DRAIN/INJECT LARGE JOINT/BURSA     sodium hyaluronate (EUFLEXXA, HYALGAN) injection 20 mg     sodium hyaluronate (EUFLEXXA, HYALGAN) injection 20 mg          PROCEDURE:  Mr. Myers's knees injected with 2 cc of Euflexxa.     Chart reviewed for the following:   Pravin DINERO MD, have reviewed the History, Physical and updated the Allergic reactions for Rusty Myers     TIME OUT performed immediately prior to start of procedure:  Pravin DINERO MD, have performed the following reviews on Rusty Myers prior to the start of the procedure:            * Patient was identified by name and date of birth   * Agreement on procedure being performed was verified  * Risks and Benefits explained to the patient  * Procedure site verified and marked as necessary  * Patient was positioned for comfort  * Consent was obtained     Time: 10:01 AM     Date of procedure: 3/10/2025    Procedure performed by:  Pravin Hatfield MD    Mr. Myers tolerated the procedure well with no complications.      PLAN:  Mr. Myers will follow up in one week to continue his visco supplementation injection series.

## 2025-03-13 NOTE — PROGRESS NOTES
Patient: Rusty Myers                MRN: [id number]       SSN: [id number]  YOB: 1960        AGE: 65 y.o.        SEX: male  There is no height or weight on file to calculate BMI.    PCP: Zeyad Lim MD  03/17/25    Chief Complaint   Patient presents with    Knee Pain     bilateral     HISTORY:  Rsuty Myers is a 65 y.o. male who is seen for bilateral knee pain. He presents today for his second injection in the Euflexxa visco supplementation series.      ICD-10-CM    1. Primary osteoarthritis of left knee  M17.12 DRAIN/INJECT LARGE JOINT/BURSA     sodium hyaluronate (EUFLEXXA, HYALGAN) injection 20 mg      2. Primary osteoarthritis of right knee  M17.11 DRAIN/INJECT LARGE JOINT/BURSA     sodium hyaluronate (EUFLEXXA, HYALGAN) injection 20 mg         PROCEDURE:  Mr. Jimenez knees injected with 2 cc of Euflexxa.     Chart reviewed for the following:   Pravin DINERO MD, have reviewed the History, Physical and updated the Allergic reactions for Rusty Myers     TIME OUT performed immediately prior to start of procedure:  Pravin DINERO MD, have performed the following reviews on Rusty Myers prior to the start of the procedure:            * Patient was identified by name and date of birth   * Agreement on procedure being performed was verified  * Risks and Benefits explained to the patient  * Procedure site verified and marked as necessary  * Patient was positioned for comfort  * Consent was obtained     Time: 9:12 AM     Date of procedure: 3/17/2025    Procedure performed by:  Pravin Hatfield MD    Mr. Myers tolerated the procedure well with no complications.    PLAN: Mr. Jimenez knees injected with 2 cc of Euflexxa. Mr. Myers will follow up in one week to complete his visco supplementation injection series.    Documentation by kristofer Birmingham, as documented by Parvin Hatfield MD.

## 2025-03-17 ENCOUNTER — OFFICE VISIT (OUTPATIENT)
Age: 65
End: 2025-03-17

## 2025-03-17 DIAGNOSIS — M17.12 PRIMARY OSTEOARTHRITIS OF LEFT KNEE: Primary | ICD-10-CM

## 2025-03-17 DIAGNOSIS — M17.11 PRIMARY OSTEOARTHRITIS OF RIGHT KNEE: ICD-10-CM

## 2025-03-20 NOTE — PROGRESS NOTES
Patient: Rusty Myers                MRN: [id number]       SSN: [id number]  YOB: 1960        AGE: 65 y.o.        SEX: male  Body mass index is 24.55 kg/m².    PCP: Zeyad Lim MD  03/24/25    Chief Complaint   Patient presents with    Injections     Bilateral knee Euflexxa #3     HISTORY:  Rusty Myers is a 65 y.o. male who is seen for bilateral knee pain. He presents today for his third injection in the Euflexxa visco supplementation series.    PROCEDURE:  Mr. Jimenez knees injected with 2 cc of Euflexxa.     TIME OUT performed immediately prior to start of procedure:  IPravin MD, have performed the following reviews on Rusty Myers prior to the start of the procedure:            * Patient was identified by name and date of birth   * Agreement on procedure being performed was verified  * Risks and Benefits explained to the patient  * Procedure site verified and marked as necessary  * Patient was positioned for comfort  * Consent was obtained     Time: 9:32 AM     Date of procedure: 3/24/2025    Procedure performed by:  Pravin Hatfield MD    Mr. Myers tolerated the procedure well with no complications      ICD-10-CM    1. Primary osteoarthritis of left knee  M17.12 DRAIN/INJECT LARGE JOINT/BURSA     sodium hyaluronate (EUFLEXXA, HYALGAN) injection 20 mg      2. Primary osteoarthritis of right knee  M17.11 DRAIN/INJECT LARGE JOINT/BURSA     sodium hyaluronate (EUFLEXXA, HYALGAN) injection 20 mg        PLAN: Mr. Jimenez knees injected with 2 cc of Euflexxa. Mr. Myers will follow up PRN now that he has completed his visco supplementation injection series.     Documentation by kristofer Birmingham, as documented by Pravin Hatfield MD.

## 2025-03-24 ENCOUNTER — OFFICE VISIT (OUTPATIENT)
Age: 65
End: 2025-03-24
Payer: COMMERCIAL

## 2025-03-24 VITALS — BODY MASS INDEX: 24.52 KG/M2 | HEIGHT: 72 IN | RESPIRATION RATE: 16 BRPM | WEIGHT: 181 LBS

## 2025-03-24 DIAGNOSIS — M17.11 PRIMARY OSTEOARTHRITIS OF RIGHT KNEE: ICD-10-CM

## 2025-03-24 DIAGNOSIS — M17.12 PRIMARY OSTEOARTHRITIS OF LEFT KNEE: Primary | ICD-10-CM

## 2025-03-24 PROCEDURE — 20610 DRAIN/INJ JOINT/BURSA W/O US: CPT | Performed by: SPECIALIST
